# Patient Record
Sex: FEMALE | Race: WHITE | NOT HISPANIC OR LATINO | Employment: FULL TIME | URBAN - METROPOLITAN AREA
[De-identification: names, ages, dates, MRNs, and addresses within clinical notes are randomized per-mention and may not be internally consistent; named-entity substitution may affect disease eponyms.]

---

## 2022-06-15 ENCOUNTER — TRANSCRIBE ORDERS (OUTPATIENT)
Dept: MATERNAL FETAL MEDICINE | Facility: CLINIC | Age: 42
End: 2022-06-15

## 2022-06-15 DIAGNOSIS — O26.90 PREGNANCY RELATED CONDITION, ANTEPARTUM: Primary | ICD-10-CM

## 2022-06-16 ENCOUNTER — PRE VISIT (OUTPATIENT)
Dept: MATERNAL FETAL MEDICINE | Facility: CLINIC | Age: 42
End: 2022-06-16
Payer: COMMERCIAL

## 2022-06-23 ENCOUNTER — HOSPITAL ENCOUNTER (OUTPATIENT)
Dept: ULTRASOUND IMAGING | Facility: CLINIC | Age: 42
Discharge: HOME | End: 2022-06-23
Attending: NURSE PRACTITIONER
Payer: COMMERCIAL

## 2022-06-23 ENCOUNTER — OFFICE VISIT (OUTPATIENT)
Dept: MATERNAL FETAL MEDICINE | Facility: CLINIC | Age: 42
End: 2022-06-23
Attending: NURSE PRACTITIONER
Payer: COMMERCIAL

## 2022-06-23 DIAGNOSIS — O99.210 OBESITY IN PREGNANCY, ANTEPARTUM: ICD-10-CM

## 2022-06-23 DIAGNOSIS — O26.90 PREGNANCY RELATED CONDITION, ANTEPARTUM: ICD-10-CM

## 2022-06-23 DIAGNOSIS — O09.522 MULTIGRAVIDA OF ADVANCED MATERNAL AGE IN SECOND TRIMESTER: Primary | ICD-10-CM

## 2022-06-23 DIAGNOSIS — O09.529 ANTEPARTUM MULTIGRAVIDA OF ADVANCED MATERNAL AGE: Primary | ICD-10-CM

## 2022-06-23 PROCEDURE — 76801 OB US < 14 WKS SINGLE FETUS: CPT

## 2022-06-23 PROCEDURE — 76813 OB US NUCHAL MEAS 1 GEST: CPT | Mod: 26 | Performed by: OBSTETRICS & GYNECOLOGY

## 2022-06-23 PROCEDURE — 96040 HC GENETIC COUNSELING, EACH 30 MINUTES: CPT | Performed by: GENETIC COUNSELOR, MS

## 2022-06-23 NOTE — PROGRESS NOTES
[facility] [place]  Genetic Counseling Consult    Patient: Dayana Whalen YOB: 1980   Date of Service: 22      Dayana Whalen was seen at [facility] for genetic consultation to discuss the options for screening and testing for fetal chromosome abnormalities.  The indication for genetic counseling is advanced maternal age. The patient was accompanied by her partner, Torey to today's visit.        Impression/Plan:   1.  Dayana had an NT ultrasound today. She underwent NIPT earlier in this pregnancy with primary OB which was reportedly normal. A copy of this report was not available at time of our discussion. They are aware diagnostic testing remains available.      2.  Maternal serum AFP (single marker screen) is recommended after 15 weeks to screen for open neural tube defects. A quad screen should not be performed.    3.  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 Dayana is planning to return for level II comprehensive ultrasound.     Pregnancy History:   /Parity:    Age at Delivery: 42 year old  MONALISA: 2022, by Other Basis  Gestational Age: 13w5d    No significant complications or exposures were reported in the current pregnancy.    Medical History:   Dayana has a history of hypothyroidism managed with levothyroxine. Dayana stated that she was found to have a hole in her heart at birth which resolved on it's own. Today we reviewed that there can be structural heart defects such as ASD or VSD that can be isolated and are often multifactorial, resulting from a combination of genetic and nongenetic factors. There can also be heart defects such as PFO or PDA's that are normal holes in heart structure that infants are born with that typically close shortly after birth on it's own. However, in a portion of individuals it may not close properly. We  discussed that prenatal screening by ultrasound or fetal echocardiogram are not necessarily useful for the latter types of heart defects as we would expect this hole to be open in a fetus. They were also encouraged to share this history with their pediatrician.        Family History:   A three-generation pedigree was obtained, and is scanned under the  Media  tab.   The following significant findings were reported by Dayana:    It was reported that Dayana's brother passed away at age 30. He had a history of seizures and a blood clot in his brain. We reviewed that there can be many factors including genetic factors that can increase the risk for a blood clot in the brain. We also reviewed that seizure disorders be isolated or part of a broader genetic syndrome. Dayana is not aware of any underlying genetic condition identified in her brother. They were encouraged to share this family history with their pediatrician.   Dayana's sister was reported to have glioblastoma diagnosed at age 42. Dayana's mother was reported to have a history of uterine cancer diagnosed in her 40-50's. Dayana's maternal grandmother was reported to have lung and liver cancer. These relatives were also noted to have other environmental factors such as smoking, and EtOH use. We discussed how most cancer seen in families occurs sporadically, but about 5-10% may be due to an underlying genetic etiology. The couple was encouraged to share this family history information with their primary care providers to ensure appropriate screening.    Otherwise, the reported family history is negative for multiple miscarriages, stillbirths, birth defects, intellectual disability, known genetic conditions, and consanguinity.       Carrier Screening:   The patient reports that she and the father of the pregnancy have  ancestry:     Cystic fibrosis is an autosomal recessive genetic condition that occurs with increased frequency in  individuals of  ancestry and carrier screening for this condition is available.  In addition,  screening in the [facility] includes cystic fibrosis.      Expanded carrier screening for mutations in a large panel of genes associated with autosomal recessive conditions including cystic fibrosis, spinal muscular atrophy, and others, is now available.      The couple did not elect to pursue the carrier screening options discussed today.        Risk Assessment for Chromosome Conditions:   We explained that the risk for fetal chromosome abnormalities increases with maternal age. We discussed specific features of common chromosome abnormalities, including Down syndrome, trisomy 13, trisomy 18, and sex chromosome trisomies.      - At age 42 at midtrimester, the risk to have a baby with Down syndrome is 1 in 31.     - At age 42 at midtrimester, the risk to have a baby with any chromosome abnormality is 1 in 19.     Testing Options:   We discussed the following options:   Non-invasive Prenatal Testing (NIPT)    Maternal plasma cell-free DNA testing; first trimester ultrasound with nuchal translucency and nasal bone assessment is recommended, when appropriate    Screens for fetal trisomy 21, trisomy 13, trisomy 18, and sex chromosome aneuploidy    Cannot screen for open neural tube defects; maternal serum AFP after 15 weeks is recommended     Chorionic villus sampling (CVS)    Invasive procedure typically performed in the first trimester by which placental villi are obtained for the purpose of chromosome analysis and/or other prenatal genetic analysis    Diagnostic results; >99% sensitivity for fetal chromosome abnormalities    Cannot test for open neural tube defects; maternal serum AFP after 15 weeks is recommended     Genetic Amniocentesis    Invasive procedure typically performed in the second trimester by which amniotic fluid is obtained for the purpose of chromosome analysis and/or other prenatal  genetic analysis    Diagnostic results; >99% sensitivity for fetal chromosome abnormalities    AFAFP measurement tests for open neural tube defects     Comprehensive (Level II) ultrasound: Detailed ultrasound performed between 18-22 weeks gestation to screen for major birth defects and markers for aneuploidy.      We reviewed the benefits and limitations of this testing.  Screening tests provide a risk assessment specific to the pregnancy for certain fetal chromosome abnormalities, but cannot definitively diagnose or exclude a fetal chromosome abnormality.  Follow-up genetic counseling and consideration of diagnostic testing is recommended with any abnormal screening result.     Diagnostic tests carry inherent risks- including risk of miscarriage- that require careful consideration.  These tests can detect fetal chromosome abnormalities with greater than 99% certainty.  Results can be compromised by maternal cell contamination or mosaicism, and are limited by the resolution of cytogenetic G-banding technology.  There is no screening nor diagnostic test that can detect all forms of birth defects or mental disability.     It was a pleasure to be involved with Dayana s care. Face-to-face time of the meeting was 30 minutes.    Idania Manrique MS, [facility]  Licensed Genetic Counselor  [facility]  Maternal Fetal Medicine  Ph: [phone]  [email or web address]

## 2022-06-24 NOTE — PROGRESS NOTES
"Please see \"Imaging\" tab under \"Chart Review\" for details of today's ultrasound.    Caden Bustamante M.D.  Specialist in Maternal-Fetal Medicine     "

## 2022-07-24 ENCOUNTER — HEALTH MAINTENANCE LETTER (OUTPATIENT)
Age: 42
End: 2022-07-24

## 2022-07-28 ENCOUNTER — HOSPITAL ENCOUNTER (OUTPATIENT)
Dept: ULTRASOUND IMAGING | Facility: CLINIC | Age: 42
Discharge: HOME | End: 2022-07-28
Attending: OBSTETRICS & GYNECOLOGY
Payer: COMMERCIAL

## 2022-07-28 ENCOUNTER — OFFICE VISIT (OUTPATIENT)
Dept: MATERNAL FETAL MEDICINE | Facility: CLINIC | Age: 42
End: 2022-07-28
Attending: OBSTETRICS & GYNECOLOGY
Payer: COMMERCIAL

## 2022-07-28 DIAGNOSIS — O99.210 OBESITY IN PREGNANCY, ANTEPARTUM: Primary | ICD-10-CM

## 2022-07-28 DIAGNOSIS — O09.529 ANTEPARTUM MULTIGRAVIDA OF ADVANCED MATERNAL AGE: ICD-10-CM

## 2022-07-28 DIAGNOSIS — Z82.79 FAMILY HISTORY OF CONGENITAL ANOMALIES: ICD-10-CM

## 2022-07-28 DIAGNOSIS — O99.210 OBESITY IN PREGNANCY, ANTEPARTUM: ICD-10-CM

## 2022-07-28 PROCEDURE — 76811 OB US DETAILED SNGL FETUS: CPT | Mod: 26 | Performed by: OBSTETRICS & GYNECOLOGY

## 2022-07-28 PROCEDURE — 76811 OB US DETAILED SNGL FETUS: CPT

## 2022-09-07 ENCOUNTER — HOSPITAL ENCOUNTER (OUTPATIENT)
Dept: CARDIOLOGY | Facility: CLINIC | Age: 42
Discharge: HOME | End: 2022-09-07
Attending: OBSTETRICS & GYNECOLOGY | Admitting: OBSTETRICS & GYNECOLOGY
Payer: COMMERCIAL

## 2022-09-07 ENCOUNTER — OFFICE VISIT (OUTPATIENT)
Dept: CARDIOLOGY | Facility: CLINIC | Age: 42
End: 2022-09-07
Payer: COMMERCIAL

## 2022-09-07 DIAGNOSIS — Z82.79 FAMILY HISTORY OF CONGENITAL ANOMALIES: ICD-10-CM

## 2022-09-07 DIAGNOSIS — O99.210 OBESITY IN PREGNANCY, ANTEPARTUM: ICD-10-CM

## 2022-09-07 DIAGNOSIS — O35.BXX0 FETAL CARDIAC DISEASE AFFECTING PREGNANCY, SINGLE OR UNSPECIFIED FETUS: Primary | ICD-10-CM

## 2022-09-07 PROCEDURE — 93325 DOPPLER ECHO COLOR FLOW MAPG: CPT

## 2022-09-07 PROCEDURE — 99202 OFFICE O/P NEW SF 15 MIN: CPT | Mod: 25 | Performed by: PEDIATRICS

## 2022-09-07 PROCEDURE — 76825 ECHO EXAM OF FETAL HEART: CPT | Mod: 26 | Performed by: PEDIATRICS

## 2022-09-07 PROCEDURE — 76827 ECHO EXAM OF FETAL HEART: CPT | Mod: 26 | Performed by: PEDIATRICS

## 2022-09-07 PROCEDURE — 93325 DOPPLER ECHO COLOR FLOW MAPG: CPT | Mod: 26 | Performed by: PEDIATRICS

## 2022-09-07 NOTE — PROGRESS NOTES
"[facility]etal Consult Note    Patient:  Dayana Whalen MRN:  [id number]   YOB: 1980 Age:  41 year old   Date of Visit:  2022 PCP:  Angelique Alvarez APRN CNM     Dear Doctor,     I had the pleasure of seeing Dayana Whalen at the [facility] on 2022 in fetal cardiology consultation for fetal echocardiogram results. She presented today accompanied by her . As you know, she is a 41 year old  at 24w2d who presented for fetal echocardiogram today because of maternal history of \"hole in her heart\" and incomplete cardiac exam.    I performed and interpreted the fetal echocardiogram today, which demonstrated likely normal fetal echocardiogram. Normal fetal intracardiac connections. Normal right and left ventricular size and function. The aortic isthmus was not well visualized; no indirect evidence of coarcation of the aorta. No hydrops.    I reviewed the echo findings today with Dayana Whalen and her . Although this was a technically difficult study, the patient is aware that no obvious cardiac abnormalities were identified. She is aware of the general limitations of fetal echocardiography. No additional fetal echocardiograms are recommended. No  cardiac follow-up is required.     Thank you for allowing me to participate in Dayana's care. Please do not hesitate to contact me with questions or concerns.    This visit was separate from the performance and interpretation of the ultrasound. The majority of the time (>50%) was spent in counseling and coordination of care. I spent approximately 20 minutes in face-to-face time reviewing the above considerations.    Tonya Sanabria M.D.  Pediatric Cardiology  [address], 5th floor, [place] [address]  Phone [phone]  Fax [phone]    "

## 2022-09-22 ENCOUNTER — TRANSFERRED RECORDS (OUTPATIENT)
Dept: HEALTH INFORMATION MANAGEMENT | Facility: CLINIC | Age: 42
End: 2022-09-22

## 2022-10-03 ENCOUNTER — HEALTH MAINTENANCE LETTER (OUTPATIENT)
Age: 42
End: 2022-10-03

## 2022-10-06 ENCOUNTER — HOSPITAL ENCOUNTER (OUTPATIENT)
Dept: ULTRASOUND IMAGING | Facility: CLINIC | Age: 42
Discharge: HOME | End: 2022-10-06
Attending: OBSTETRICS & GYNECOLOGY
Payer: COMMERCIAL

## 2022-10-06 ENCOUNTER — OFFICE VISIT (OUTPATIENT)
Dept: MATERNAL FETAL MEDICINE | Facility: CLINIC | Age: 42
End: 2022-10-06
Attending: OBSTETRICS & GYNECOLOGY
Payer: COMMERCIAL

## 2022-10-06 DIAGNOSIS — O99.210 OBESITY IN PREGNANCY, ANTEPARTUM: ICD-10-CM

## 2022-10-06 DIAGNOSIS — O09.513 AMA (ADVANCED MATERNAL AGE) PRIMIGRAVIDA 35+, THIRD TRIMESTER: Primary | ICD-10-CM

## 2022-10-06 DIAGNOSIS — Z82.79 FAMILY HISTORY OF CONGENITAL ANOMALIES: ICD-10-CM

## 2022-10-06 PROCEDURE — 76816 OB US FOLLOW-UP PER FETUS: CPT

## 2022-10-06 PROCEDURE — 76816 OB US FOLLOW-UP PER FETUS: CPT | Mod: 26 | Performed by: OBSTETRICS & GYNECOLOGY

## 2022-11-23 ENCOUNTER — MEDICAL CORRESPONDENCE (OUTPATIENT)
Dept: HEALTH INFORMATION MANAGEMENT | Facility: CLINIC | Age: 42
End: 2022-11-23

## 2022-11-25 ENCOUNTER — TRANSCRIBE ORDERS (OUTPATIENT)
Dept: MATERNAL FETAL MEDICINE | Facility: CLINIC | Age: 42
End: 2022-11-25

## 2022-11-25 DIAGNOSIS — O26.90 PREGNANCY RELATED CONDITION, ANTEPARTUM: Primary | ICD-10-CM

## 2022-12-01 ENCOUNTER — OFFICE VISIT (OUTPATIENT)
Dept: MATERNAL FETAL MEDICINE | Facility: CLINIC | Age: 42
End: 2022-12-01
Attending: NURSE PRACTITIONER
Payer: COMMERCIAL

## 2022-12-01 ENCOUNTER — HOSPITAL ENCOUNTER (OUTPATIENT)
Dept: ULTRASOUND IMAGING | Facility: CLINIC | Age: 42
Discharge: HOME | End: 2022-12-01
Attending: NURSE PRACTITIONER
Payer: COMMERCIAL

## 2022-12-01 DIAGNOSIS — O09.523 AMA (ADVANCED MATERNAL AGE) MULTIGRAVIDA 35+, THIRD TRIMESTER: Primary | ICD-10-CM

## 2022-12-01 DIAGNOSIS — O26.90 PREGNANCY RELATED CONDITION, ANTEPARTUM: ICD-10-CM

## 2022-12-01 PROCEDURE — 76816 OB US FOLLOW-UP PER FETUS: CPT

## 2022-12-01 PROCEDURE — 76819 FETAL BIOPHYS PROFIL W/O NST: CPT | Mod: 26 | Performed by: OBSTETRICS & GYNECOLOGY

## 2022-12-01 PROCEDURE — 76816 OB US FOLLOW-UP PER FETUS: CPT | Mod: 26 | Performed by: OBSTETRICS & GYNECOLOGY

## 2022-12-01 NOTE — PROGRESS NOTES
Please see full imaging report from ViewPoint program under imaging tab.    Luzmaria Hope MD  Maternal Fetal Medicine

## 2023-01-07 ENCOUNTER — HOSPITAL ENCOUNTER (INPATIENT)
Facility: CLINIC | Age: 43
LOS: 3 days | Discharge: HOME | End: 2023-01-10
Attending: OBSTETRICS & GYNECOLOGY | Admitting: OBSTETRICS & GYNECOLOGY
Payer: COMMERCIAL

## 2023-01-07 ENCOUNTER — TRANSFERRED RECORDS (OUTPATIENT)
Dept: HEALTH INFORMATION MANAGEMENT | Facility: CLINIC | Age: 43
End: 2023-01-07

## 2023-01-07 DIAGNOSIS — D62 ABLA (ACUTE BLOOD LOSS ANEMIA): Primary | ICD-10-CM

## 2023-01-07 DIAGNOSIS — Z98.891 S/P CESAREAN SECTION: ICD-10-CM

## 2023-01-07 PROBLEM — L90.0 LICHEN SCLEROSUS: Status: ACTIVE | Noted: 2020-12-16

## 2023-01-07 PROBLEM — Z34.90 ENCOUNTER FOR INDUCTION OF LABOR: Status: ACTIVE | Noted: 2023-01-07

## 2023-01-07 PROBLEM — R73.03 PREDIABETES: Status: ACTIVE | Noted: 2020-12-16

## 2023-01-07 PROBLEM — O09.93 HIGH-RISK PREGNANCY SUPERVISION, THIRD TRIMESTER: Status: ACTIVE | Noted: 2022-05-02

## 2023-01-07 PROBLEM — N94.3 PMS (PREMENSTRUAL SYNDROME): Status: ACTIVE | Noted: 2020-12-16

## 2023-01-07 PROBLEM — O24.410 DIET CONTROLLED GESTATIONAL DIABETES MELLITUS (GDM) IN THIRD TRIMESTER: Status: ACTIVE | Noted: 2023-01-07

## 2023-01-07 PROBLEM — O09.519 SUPERVISION OF PRIMIGRAVIDA OF ADVANCED MATERNAL AGE, ANTEPARTUM: Status: ACTIVE | Noted: 2022-05-02

## 2023-01-07 PROBLEM — E66.01 MORBID OBESITY (H): Status: ACTIVE | Noted: 2023-01-07

## 2023-01-07 LAB
ABO/RH(D): NORMAL
ANTIBODY SCREEN: NEGATIVE
ERYTHROCYTE [DISTWIDTH] IN BLOOD BY AUTOMATED COUNT: 14.6 % (ref 10–15)
GLUCOSE BLDC GLUCOMTR-MCNC: 78 MG/DL (ref 70–99)
GLUCOSE BLDC GLUCOMTR-MCNC: 97 MG/DL (ref 70–99)
HCT VFR BLD AUTO: 34.2 % (ref 35–47)
HGB BLD-MCNC: 11.2 G/DL (ref 11.7–15.7)
MCH RBC QN AUTO: 28.1 PG (ref 26.5–33)
MCHC RBC AUTO-ENTMCNC: 32.7 G/DL (ref 31.5–36.5)
MCV RBC AUTO: 86 FL (ref 78–100)
PLATELET # BLD AUTO: 247 10E3/UL (ref 150–450)
RBC # BLD AUTO: 3.98 10E6/UL (ref 3.8–5.2)
SARS-COV-2 RNA RESP QL NAA+PROBE: NEGATIVE
SPECIMEN EXPIRATION DATE: NORMAL
WBC # BLD AUTO: 11.2 10E3/UL (ref 4–11)

## 2023-01-07 PROCEDURE — 120N000002 HC R&B MED SURG/OB UMMC

## 2023-01-07 PROCEDURE — 36415 COLL VENOUS BLD VENIPUNCTURE: CPT | Performed by: STUDENT IN AN ORGANIZED HEALTH CARE EDUCATION/TRAINING PROGRAM

## 2023-01-07 PROCEDURE — 250N000011 HC RX IP 250 OP 636: Performed by: STUDENT IN AN ORGANIZED HEALTH CARE EDUCATION/TRAINING PROGRAM

## 2023-01-07 PROCEDURE — 250N000009 HC RX 250: Performed by: STUDENT IN AN ORGANIZED HEALTH CARE EDUCATION/TRAINING PROGRAM

## 2023-01-07 PROCEDURE — 86780 TREPONEMA PALLIDUM: CPT | Performed by: STUDENT IN AN ORGANIZED HEALTH CARE EDUCATION/TRAINING PROGRAM

## 2023-01-07 PROCEDURE — 86901 BLOOD TYPING SEROLOGIC RH(D): CPT | Performed by: STUDENT IN AN ORGANIZED HEALTH CARE EDUCATION/TRAINING PROGRAM

## 2023-01-07 PROCEDURE — 85027 COMPLETE CBC AUTOMATED: CPT | Performed by: STUDENT IN AN ORGANIZED HEALTH CARE EDUCATION/TRAINING PROGRAM

## 2023-01-07 PROCEDURE — U0005 INFEC AGEN DETEC AMPLI PROBE: HCPCS | Performed by: STUDENT IN AN ORGANIZED HEALTH CARE EDUCATION/TRAINING PROGRAM

## 2023-01-07 RX ORDER — PROCHLORPERAZINE MALEATE 10 MG
10 TABLET ORAL EVERY 6 HOURS PRN
Status: DISCONTINUED | OUTPATIENT
Start: 2023-01-07 | End: 2023-01-09 | Stop reason: HOSPADM

## 2023-01-07 RX ORDER — NALOXONE HYDROCHLORIDE 0.4 MG/ML
0.4 INJECTION, SOLUTION INTRAMUSCULAR; INTRAVENOUS; SUBCUTANEOUS
Status: DISCONTINUED | OUTPATIENT
Start: 2023-01-07 | End: 2023-01-09 | Stop reason: HOSPADM

## 2023-01-07 RX ORDER — LEVOTHYROXINE SODIUM 125 UG/1
112 TABLET ORAL DAILY
COMMUNITY

## 2023-01-07 RX ORDER — NICOTINE POLACRILEX 4 MG
15-30 LOZENGE BUCCAL
Status: DISCONTINUED | OUTPATIENT
Start: 2023-01-07 | End: 2023-01-09

## 2023-01-07 RX ORDER — PROCHLORPERAZINE 25 MG
25 SUPPOSITORY, RECTAL RECTAL EVERY 12 HOURS PRN
Status: DISCONTINUED | OUTPATIENT
Start: 2023-01-07 | End: 2023-01-09 | Stop reason: HOSPADM

## 2023-01-07 RX ORDER — DEXTROSE MONOHYDRATE 25 G/50ML
25-50 INJECTION, SOLUTION INTRAVENOUS
Status: DISCONTINUED | OUTPATIENT
Start: 2023-01-07 | End: 2023-01-09

## 2023-01-07 RX ORDER — OXYTOCIN/0.9 % SODIUM CHLORIDE 30/500 ML
340 PLASTIC BAG, INJECTION (ML) INTRAVENOUS CONTINUOUS PRN
Status: DISCONTINUED | OUTPATIENT
Start: 2023-01-07 | End: 2023-01-09 | Stop reason: HOSPADM

## 2023-01-07 RX ORDER — NALOXONE HYDROCHLORIDE 0.4 MG/ML
0.2 INJECTION, SOLUTION INTRAMUSCULAR; INTRAVENOUS; SUBCUTANEOUS
Status: DISCONTINUED | OUTPATIENT
Start: 2023-01-07 | End: 2023-01-09 | Stop reason: HOSPADM

## 2023-01-07 RX ORDER — IBUPROFEN 800 MG/1
800 TABLET, FILM COATED ORAL
Status: DISCONTINUED | OUTPATIENT
Start: 2023-01-07 | End: 2023-01-09

## 2023-01-07 RX ORDER — OXYTOCIN/0.9 % SODIUM CHLORIDE 30/500 ML
100-340 PLASTIC BAG, INJECTION (ML) INTRAVENOUS CONTINUOUS PRN
Status: DISCONTINUED | OUTPATIENT
Start: 2023-01-07 | End: 2023-01-09

## 2023-01-07 RX ORDER — PENICILLIN G 3000000 [IU]/50ML
3 INJECTION, SOLUTION INTRAVENOUS EVERY 4 HOURS
Status: DISCONTINUED | OUTPATIENT
Start: 2023-01-07 | End: 2023-01-09 | Stop reason: HOSPADM

## 2023-01-07 RX ORDER — ONDANSETRON 2 MG/ML
4 INJECTION INTRAMUSCULAR; INTRAVENOUS EVERY 6 HOURS PRN
Status: DISCONTINUED | OUTPATIENT
Start: 2023-01-07 | End: 2023-01-08

## 2023-01-07 RX ORDER — SODIUM CHLORIDE, SODIUM LACTATE, POTASSIUM CHLORIDE, CALCIUM CHLORIDE 600; 310; 30; 20 MG/100ML; MG/100ML; MG/100ML; MG/100ML
INJECTION, SOLUTION INTRAVENOUS CONTINUOUS
Status: DISCONTINUED | OUTPATIENT
Start: 2023-01-07 | End: 2023-01-09 | Stop reason: HOSPADM

## 2023-01-07 RX ORDER — KETOROLAC TROMETHAMINE 30 MG/ML
30 INJECTION, SOLUTION INTRAMUSCULAR; INTRAVENOUS
Status: DISCONTINUED | OUTPATIENT
Start: 2023-01-07 | End: 2023-01-09

## 2023-01-07 RX ORDER — SODIUM CHLORIDE, SODIUM LACTATE, POTASSIUM CHLORIDE, CALCIUM CHLORIDE 600; 310; 30; 20 MG/100ML; MG/100ML; MG/100ML; MG/100ML
INJECTION, SOLUTION INTRAVENOUS
Status: DISCONTINUED
Start: 2023-01-07 | End: 2023-01-08 | Stop reason: HOSPADM

## 2023-01-07 RX ORDER — FENTANYL CITRATE 50 UG/ML
100 INJECTION, SOLUTION INTRAMUSCULAR; INTRAVENOUS
Status: DISCONTINUED | OUTPATIENT
Start: 2023-01-07 | End: 2023-01-09 | Stop reason: HOSPADM

## 2023-01-07 RX ORDER — OXYTOCIN 10 [USP'U]/ML
10 INJECTION, SOLUTION INTRAMUSCULAR; INTRAVENOUS
Status: DISCONTINUED | OUTPATIENT
Start: 2023-01-07 | End: 2023-01-09

## 2023-01-07 RX ORDER — ONDANSETRON 4 MG/1
4 TABLET, ORALLY DISINTEGRATING ORAL EVERY 6 HOURS PRN
Status: DISCONTINUED | OUTPATIENT
Start: 2023-01-07 | End: 2023-01-08

## 2023-01-07 RX ORDER — LIDOCAINE 40 MG/G
CREAM TOPICAL
Status: DISCONTINUED | OUTPATIENT
Start: 2023-01-07 | End: 2023-01-09 | Stop reason: HOSPADM

## 2023-01-07 RX ORDER — SODIUM CHLORIDE, SODIUM LACTATE, POTASSIUM CHLORIDE, CALCIUM CHLORIDE 600; 310; 30; 20 MG/100ML; MG/100ML; MG/100ML; MG/100ML
INJECTION, SOLUTION INTRAVENOUS CONTINUOUS PRN
Status: DISCONTINUED | OUTPATIENT
Start: 2023-01-07 | End: 2023-01-09 | Stop reason: HOSPADM

## 2023-01-07 RX ORDER — METHYLERGONOVINE MALEATE 0.2 MG/ML
200 INJECTION INTRAVENOUS
Status: DISCONTINUED | OUTPATIENT
Start: 2023-01-07 | End: 2023-01-09 | Stop reason: HOSPADM

## 2023-01-07 RX ORDER — MISOPROSTOL 200 UG/1
800 TABLET ORAL
Status: DISCONTINUED | OUTPATIENT
Start: 2023-01-07 | End: 2023-01-09 | Stop reason: HOSPADM

## 2023-01-07 RX ORDER — CITRIC ACID/SODIUM CITRATE 334-500MG
30 SOLUTION, ORAL ORAL
Status: DISCONTINUED | OUTPATIENT
Start: 2023-01-07 | End: 2023-01-09 | Stop reason: HOSPADM

## 2023-01-07 RX ORDER — CITRIC ACID/SODIUM CITRATE 334-500MG
30 SOLUTION, ORAL ORAL ONCE
Status: COMPLETED | OUTPATIENT
Start: 2023-01-07 | End: 2023-01-08

## 2023-01-07 RX ORDER — OXYTOCIN/0.9 % SODIUM CHLORIDE 30/500 ML
1-30 PLASTIC BAG, INJECTION (ML) INTRAVENOUS CONTINUOUS
Status: DISCONTINUED | OUTPATIENT
Start: 2023-01-07 | End: 2023-01-09 | Stop reason: HOSPADM

## 2023-01-07 RX ORDER — METOCLOPRAMIDE HYDROCHLORIDE 5 MG/ML
10 INJECTION INTRAMUSCULAR; INTRAVENOUS EVERY 6 HOURS PRN
Status: DISCONTINUED | OUTPATIENT
Start: 2023-01-07 | End: 2023-01-09 | Stop reason: HOSPADM

## 2023-01-07 RX ORDER — PENICILLIN G POTASSIUM 5000000 [IU]/1
5 INJECTION, POWDER, FOR SOLUTION INTRAMUSCULAR; INTRAVENOUS ONCE
Status: COMPLETED | OUTPATIENT
Start: 2023-01-07 | End: 2023-01-07

## 2023-01-07 RX ORDER — CARBOPROST TROMETHAMINE 250 UG/ML
250 INJECTION, SOLUTION INTRAMUSCULAR
Status: DISCONTINUED | OUTPATIENT
Start: 2023-01-07 | End: 2023-01-09 | Stop reason: HOSPADM

## 2023-01-07 RX ORDER — METOCLOPRAMIDE 10 MG/1
10 TABLET ORAL EVERY 6 HOURS PRN
Status: DISCONTINUED | OUTPATIENT
Start: 2023-01-07 | End: 2023-01-09 | Stop reason: HOSPADM

## 2023-01-07 RX ORDER — MISOPROSTOL 200 UG/1
400 TABLET ORAL
Status: DISCONTINUED | OUTPATIENT
Start: 2023-01-07 | End: 2023-01-09 | Stop reason: HOSPADM

## 2023-01-07 RX ORDER — TRANEXAMIC ACID 10 MG/ML
1 INJECTION, SOLUTION INTRAVENOUS EVERY 30 MIN PRN
Status: DISCONTINUED | OUTPATIENT
Start: 2023-01-07 | End: 2023-01-09 | Stop reason: HOSPADM

## 2023-01-07 RX ORDER — OXYTOCIN 10 [USP'U]/ML
10 INJECTION, SOLUTION INTRAMUSCULAR; INTRAVENOUS
Status: DISCONTINUED | OUTPATIENT
Start: 2023-01-07 | End: 2023-01-09 | Stop reason: HOSPADM

## 2023-01-07 RX ORDER — ACETAMINOPHEN 325 MG/1
650 TABLET ORAL EVERY 4 HOURS PRN
Status: DISCONTINUED | OUTPATIENT
Start: 2023-01-07 | End: 2023-01-09 | Stop reason: HOSPADM

## 2023-01-07 RX ADMIN — PENICILLIN G POTASSIUM 5 MILLION UNITS: 5000000 POWDER, FOR SOLUTION INTRAMUSCULAR; INTRAPLEURAL; INTRATHECAL; INTRAVENOUS at 17:23

## 2023-01-07 RX ADMIN — PENICILLIN G 3 MILLION UNITS: 3000000 INJECTION, SOLUTION INTRAVENOUS at 22:25

## 2023-01-07 RX ADMIN — Medication 2 MILLI-UNITS/MIN: at 17:30

## 2023-01-07 ASSESSMENT — ACTIVITIES OF DAILY LIVING (ADL)
ADLS_ACUITY_SCORE: 20

## 2023-01-07 NOTE — H&P
History and Physical     Dayana Whalen MRN# [id number]   YOB: 1980 Age: 42 year old      Date of Admission: 2023    Primary care provider: Angelique Alvarez      Assessment and Plan:       42 year old  at 41w5d by 13w3d US, here as MEETA from [place] for IOL in setting of GDMA1, BMI 51, post-dates. Pregnancy is notable for AMA status.    Induction of Labor   - Indication: GDMA1. Favorable SVE. To start Pitocin.   - Pain: per patient request.     Routine Prenatal Care   - Rh positive, Rubella immune, GCT failed, GBS positive - will need PCN  - Other prenatal labs wnl  - Imaging: Growth US (22): EFW 63%, AC 92%.     GDMA1   SD precautions discussed.   - mSSI, AC/HS BG in latent labor > Q2h BG in active labor, possibly insulin gtt prn      FWB:   Cat I tracing, reactive; cephalic by BSUS; EFW 7lb  - Continuous Fetal Monitoring  - Intrauterine resuscitative measures prn    Patient discussed with Dr. Pfeiffer.     Zahida Cody MD   OB/GYN PGY-3  23 1600    Appreciate Dr. Cody's note above, patient also seen and examined by me. I agree with the note above.   Lois Pfeiffer MD            HPI:     Dayana Whalen is a 42 year old  at 41w5d by 13w US presenting as MEETA from [place] for IOL d/t GDMA1, AMA, post-dates, BMI 50.    Feeling no contractions. Denies vaginal bleeding, leaking fluids, changes to fetal movement. All other ROS negative.     OB History:    OB History    Para Term  AB Living   1 0 0 0 0 0   SAB IAB Ectopic Multiple Live Births   0 0 0 0 0      # Outcome Date GA Lbr Teto/2nd Weight Sex Delivery Anes PTL Lv   1 Current                 Prenatal Lab Results:  Lab Results   Component Value Date    HGB 11.2 (L) 2023       GBS Status:   No results found for: GBS             Past Medical History:   No past medical history on file.          Past Surgical History:     Past Surgical History:   Procedure Laterality Date     AS REMOVAL OF TONSILS,12+  "Y/O  1998     CHOLECYSTECTOMY  2007             Social History:     Social History     Tobacco Use     Smoking status: Not on file     Smokeless tobacco: Not on file   Substance Use Topics     Alcohol use: Not on file             Family History:   No family history on file.          Immunizations:     Immunization History   Administered Date(s) Administered     COVID-19 Vaccine ([facility]) 04/10/2021     COVID-19 Vaccine 12+ (Pfizer 2022) 04/10/2022            Allergies:     Allergies   Allergen Reactions     Sulfa Drugs Hives             Medications:     Medications Prior to Admission   Medication Sig Dispense Refill Last Dose     levothyroxine (SYNTHROID/LEVOTHROID) 125 MCG tablet Take 125 mcg by mouth daily   1/7/2023             Review of Systems & Physical Exam:     The Review of Systems is negative other than noted in the HPI      /58 (BP Location: Left arm, Patient Position: Sitting, Cuff Size: Adult Large)   Temp 98.3  F (36.8  C) (Oral)   Ht 1.6 m (5' 3\")   Wt 129.3 kg (285 lb)   LMP 03/17/2022   BMI 50.49 kg/m    Gen: Well appearing   CV: RRR, nl S1/S2, no murmurs/clicks/gallops  Lungs: CTAB, non-labored breathing  Abd: Gravid, non-tender, non-distended  Ext: Trace peripheral extremity edema    Cervix: 4/50/-2 per Dr. Pfeiffer's check   Membranes: Intact   Presentation: Ceph by BSUS.  Estimated Fetal Weight: 7lb    FHT:  Monitoring External  FHT: Baseline 145 bpm; mod variability; pos accels present; no decelerations  TOCO 1-2 contractions in 10 minutes         Data:     Recent Results (from the past 24 hour(s))   Asymptomatic COVID-19 Virus (Coronavirus) by PCR Nose    Collection Time: 01/07/23  4:41 PM    Specimen: Nose; Swab   Result Value Ref Range    SARS CoV2 PCR Negative Negative   CBC with platelets    Collection Time: 01/07/23  4:43 PM   Result Value Ref Range    WBC Count 11.2 (H) 4.0 - 11.0 10e3/uL    RBC Count 3.98 3.80 - 5.20 10e6/uL    Hemoglobin 11.2 (L) 11.7 - 15.7 g/dL    Hematocrit " 34.2 (L) 35.0 - 47.0 %    MCV 86 78 - 100 fL    MCH 28.1 26.5 - 33.0 pg    MCHC 32.7 31.5 - 36.5 g/dL    RDW 14.6 10.0 - 15.0 %    Platelet Count 247 150 - 450 10e3/uL   Glucose by meter    Collection Time: 01/07/23  4:52 PM   Result Value Ref Range    GLUCOSE BY METER POCT 78 70 - 99 mg/dL            Zahida Cody MD  OB/GYN PGY-3  01/07/23 4:26 PM

## 2023-01-08 ENCOUNTER — ANESTHESIA (OUTPATIENT)
Dept: OBGYN | Facility: CLINIC | Age: 43
End: 2023-01-08
Payer: COMMERCIAL

## 2023-01-08 ENCOUNTER — ANESTHESIA EVENT (OUTPATIENT)
Dept: OBGYN | Facility: CLINIC | Age: 43
End: 2023-01-08
Payer: COMMERCIAL

## 2023-01-08 LAB
GLUCOSE BLDC GLUCOMTR-MCNC: 104 MG/DL (ref 70–99)
GLUCOSE BLDC GLUCOMTR-MCNC: 115 MG/DL (ref 70–99)
GLUCOSE BLDC GLUCOMTR-MCNC: 80 MG/DL (ref 70–99)
GLUCOSE BLDC GLUCOMTR-MCNC: 86 MG/DL (ref 70–99)
GLUCOSE BLDC GLUCOMTR-MCNC: 92 MG/DL (ref 70–99)
GLUCOSE BLDC GLUCOMTR-MCNC: 93 MG/DL (ref 70–99)
GLUCOSE BLDC GLUCOMTR-MCNC: 98 MG/DL (ref 70–99)
GLUCOSE BLDC GLUCOMTR-MCNC: 99 MG/DL (ref 70–99)
HOLD SPECIMEN: NORMAL
HOLD SPECIMEN: NORMAL
T PALLIDUM AB SER QL: NONREACTIVE

## 2023-01-08 PROCEDURE — 59514 CESAREAN DELIVERY ONLY: CPT | Mod: GC | Performed by: OBSTETRICS & GYNECOLOGY

## 2023-01-08 PROCEDURE — 360N000076 HC SURGERY LEVEL 3, PER MIN: Performed by: OBSTETRICS & GYNECOLOGY

## 2023-01-08 PROCEDURE — 258N000003 HC RX IP 258 OP 636: Performed by: STUDENT IN AN ORGANIZED HEALTH CARE EDUCATION/TRAINING PROGRAM

## 2023-01-08 PROCEDURE — 120N000002 HC R&B MED SURG/OB UMMC

## 2023-01-08 PROCEDURE — 250N000011 HC RX IP 250 OP 636: Performed by: ANESTHESIOLOGY

## 2023-01-08 PROCEDURE — C9290 INJ, BUPIVACAINE LIPOSOME: HCPCS | Performed by: ANESTHESIOLOGY

## 2023-01-08 PROCEDURE — 250N000011 HC RX IP 250 OP 636: Performed by: STUDENT IN AN ORGANIZED HEALTH CARE EDUCATION/TRAINING PROGRAM

## 2023-01-08 PROCEDURE — 250N000009 HC RX 250: Performed by: STUDENT IN AN ORGANIZED HEALTH CARE EDUCATION/TRAINING PROGRAM

## 2023-01-08 PROCEDURE — 10907ZC DRAINAGE OF AMNIOTIC FLUID, THERAPEUTIC FROM PRODUCTS OF CONCEPTION, VIA NATURAL OR ARTIFICIAL OPENING: ICD-10-PCS | Performed by: OBSTETRICS & GYNECOLOGY

## 2023-01-08 PROCEDURE — 370N000017 HC ANESTHESIA TECHNICAL FEE, PER MIN: Performed by: OBSTETRICS & GYNECOLOGY

## 2023-01-08 PROCEDURE — 272N000001 HC OR GENERAL SUPPLY STERILE: Performed by: OBSTETRICS & GYNECOLOGY

## 2023-01-08 PROCEDURE — 250N000013 HC RX MED GY IP 250 OP 250 PS 637: Performed by: STUDENT IN AN ORGANIZED HEALTH CARE EDUCATION/TRAINING PROGRAM

## 2023-01-08 PROCEDURE — 999N000141 HC STATISTIC PRE-PROCEDURE NURSING ASSESSMENT: Performed by: OBSTETRICS & GYNECOLOGY

## 2023-01-08 PROCEDURE — 258N000003 HC RX IP 258 OP 636

## 2023-01-08 PROCEDURE — 271N000001 HC OR GENERAL SUPPLY NON-STERILE: Performed by: OBSTETRICS & GYNECOLOGY

## 2023-01-08 RX ORDER — BUPIVACAINE HYDROCHLORIDE 2.5 MG/ML
INJECTION, SOLUTION EPIDURAL; INFILTRATION; INTRACAUDAL
Status: DISCONTINUED | OUTPATIENT
Start: 2023-01-08 | End: 2023-01-08

## 2023-01-08 RX ORDER — ONDANSETRON 4 MG/1
4 TABLET, ORALLY DISINTEGRATING ORAL EVERY 6 HOURS PRN
Status: DISCONTINUED | OUTPATIENT
Start: 2023-01-08 | End: 2023-01-09 | Stop reason: HOSPADM

## 2023-01-08 RX ORDER — METHYLERGONOVINE MALEATE 0.2 MG/ML
INJECTION INTRAVENOUS PRN
Status: DISCONTINUED | OUTPATIENT
Start: 2023-01-08 | End: 2023-01-08

## 2023-01-08 RX ORDER — ONDANSETRON 2 MG/ML
INJECTION INTRAMUSCULAR; INTRAVENOUS PRN
Status: DISCONTINUED | OUTPATIENT
Start: 2023-01-08 | End: 2023-01-08

## 2023-01-08 RX ORDER — CEFAZOLIN SODIUM 1 G/3ML
INJECTION, POWDER, FOR SOLUTION INTRAMUSCULAR; INTRAVENOUS PRN
Status: DISCONTINUED | OUTPATIENT
Start: 2023-01-08 | End: 2023-01-08

## 2023-01-08 RX ORDER — AZITHROMYCIN 500 MG/5ML
INJECTION, POWDER, LYOPHILIZED, FOR SOLUTION INTRAVENOUS
Status: DISCONTINUED
Start: 2023-01-08 | End: 2023-01-08 | Stop reason: HOSPADM

## 2023-01-08 RX ORDER — AZITHROMYCIN 500 MG/5ML
500 INJECTION, POWDER, LYOPHILIZED, FOR SOLUTION INTRAVENOUS
Status: CANCELLED | OUTPATIENT
Start: 2023-01-08

## 2023-01-08 RX ORDER — SODIUM CHLORIDE 9 MG/ML
INJECTION, SOLUTION INTRAVENOUS CONTINUOUS
Status: DISCONTINUED | OUTPATIENT
Start: 2023-01-08 | End: 2023-01-09 | Stop reason: HOSPADM

## 2023-01-08 RX ORDER — MISOPROSTOL 200 UG/1
800 TABLET ORAL
Status: CANCELLED | OUTPATIENT
Start: 2023-01-08

## 2023-01-08 RX ORDER — SODIUM CHLORIDE, SODIUM LACTATE, POTASSIUM CHLORIDE, CALCIUM CHLORIDE 600; 310; 30; 20 MG/100ML; MG/100ML; MG/100ML; MG/100ML
INJECTION, SOLUTION INTRAVENOUS CONTINUOUS PRN
Status: DISCONTINUED | OUTPATIENT
Start: 2023-01-08 | End: 2023-01-08

## 2023-01-08 RX ORDER — CARBOPROST TROMETHAMINE 250 UG/ML
250 INJECTION, SOLUTION INTRAMUSCULAR
Status: CANCELLED | OUTPATIENT
Start: 2023-01-08

## 2023-01-08 RX ORDER — OXYTOCIN/0.9 % SODIUM CHLORIDE 30/500 ML
PLASTIC BAG, INJECTION (ML) INTRAVENOUS CONTINUOUS PRN
Status: DISCONTINUED | OUTPATIENT
Start: 2023-01-08 | End: 2023-01-08

## 2023-01-08 RX ORDER — SODIUM CHLORIDE, SODIUM LACTATE, POTASSIUM CHLORIDE, CALCIUM CHLORIDE 600; 310; 30; 20 MG/100ML; MG/100ML; MG/100ML; MG/100ML
INJECTION, SOLUTION INTRAVENOUS CONTINUOUS
Status: CANCELLED | OUTPATIENT
Start: 2023-01-08

## 2023-01-08 RX ORDER — MORPHINE SULFATE 1 MG/ML
150 INJECTION, SOLUTION EPIDURAL; INTRATHECAL; INTRAVENOUS ONCE
Status: DISCONTINUED | OUTPATIENT
Start: 2023-01-08 | End: 2023-01-09 | Stop reason: HOSPADM

## 2023-01-08 RX ORDER — CEFAZOLIN SODIUM/WATER 3 G/30 ML
3 SYRINGE (ML) INTRAVENOUS
Status: CANCELLED | OUTPATIENT
Start: 2023-01-08

## 2023-01-08 RX ORDER — MISOPROSTOL 200 UG/1
400 TABLET ORAL
Status: CANCELLED | OUTPATIENT
Start: 2023-01-08

## 2023-01-08 RX ORDER — CITRIC ACID/SODIUM CITRATE 334-500MG
30 SOLUTION, ORAL ORAL
Status: CANCELLED | OUTPATIENT
Start: 2023-01-08

## 2023-01-08 RX ORDER — METHYLERGONOVINE MALEATE 0.2 MG/ML
200 INJECTION INTRAVENOUS
Status: CANCELLED | OUTPATIENT
Start: 2023-01-08

## 2023-01-08 RX ORDER — FENTANYL CITRATE 50 UG/ML
10 INJECTION, SOLUTION INTRAMUSCULAR; INTRAVENOUS ONCE
Status: DISCONTINUED | OUTPATIENT
Start: 2023-01-08 | End: 2023-01-09 | Stop reason: HOSPADM

## 2023-01-08 RX ORDER — TRANEXAMIC ACID 10 MG/ML
1 INJECTION, SOLUTION INTRAVENOUS EVERY 30 MIN PRN
Status: CANCELLED | OUTPATIENT
Start: 2023-01-08

## 2023-01-08 RX ORDER — CEFAZOLIN SODIUM/WATER 3 G/30 ML
3 SYRINGE (ML) INTRAVENOUS SEE ADMIN INSTRUCTIONS
Status: CANCELLED | OUTPATIENT
Start: 2023-01-08

## 2023-01-08 RX ORDER — LIDOCAINE 40 MG/G
CREAM TOPICAL
Status: CANCELLED | OUTPATIENT
Start: 2023-01-08

## 2023-01-08 RX ORDER — FENTANYL CITRATE-0.9 % NACL/PF 10 MCG/ML
100 PLASTIC BAG, INJECTION (ML) INTRAVENOUS EVERY 5 MIN PRN
Status: DISCONTINUED | OUTPATIENT
Start: 2023-01-08 | End: 2023-01-09 | Stop reason: HOSPADM

## 2023-01-08 RX ORDER — SODIUM CHLORIDE, SODIUM LACTATE, POTASSIUM CHLORIDE, CALCIUM CHLORIDE 600; 310; 30; 20 MG/100ML; MG/100ML; MG/100ML; MG/100ML
INJECTION, SOLUTION INTRAVENOUS
Status: COMPLETED
Start: 2023-01-08 | End: 2023-01-08

## 2023-01-08 RX ORDER — OXYTOCIN/0.9 % SODIUM CHLORIDE 30/500 ML
340 PLASTIC BAG, INJECTION (ML) INTRAVENOUS CONTINUOUS PRN
Status: CANCELLED | OUTPATIENT
Start: 2023-01-08

## 2023-01-08 RX ORDER — BUPIVACAINE HYDROCHLORIDE 7.5 MG/ML
INJECTION, SOLUTION INTRASPINAL
Status: COMPLETED | OUTPATIENT
Start: 2023-01-08 | End: 2023-01-08

## 2023-01-08 RX ORDER — SODIUM CHLORIDE, SODIUM LACTATE, POTASSIUM CHLORIDE, CALCIUM CHLORIDE 600; 310; 30; 20 MG/100ML; MG/100ML; MG/100ML; MG/100ML
INJECTION, SOLUTION INTRAVENOUS
Status: DISCONTINUED
Start: 2023-01-08 | End: 2023-01-08 | Stop reason: HOSPADM

## 2023-01-08 RX ORDER — ONDANSETRON 2 MG/ML
4 INJECTION INTRAMUSCULAR; INTRAVENOUS EVERY 6 HOURS PRN
Status: DISCONTINUED | OUTPATIENT
Start: 2023-01-08 | End: 2023-01-09 | Stop reason: HOSPADM

## 2023-01-08 RX ORDER — KETOROLAC TROMETHAMINE 30 MG/ML
INJECTION, SOLUTION INTRAMUSCULAR; INTRAVENOUS PRN
Status: DISCONTINUED | OUTPATIENT
Start: 2023-01-08 | End: 2023-01-08

## 2023-01-08 RX ORDER — NICOTINE POLACRILEX 4 MG
15-30 LOZENGE BUCCAL
Status: DISCONTINUED | OUTPATIENT
Start: 2023-01-08 | End: 2023-01-09 | Stop reason: HOSPADM

## 2023-01-08 RX ORDER — LEVOTHYROXINE SODIUM 112 UG/1
112 TABLET ORAL DAILY
Status: DISCONTINUED | OUTPATIENT
Start: 2023-01-08 | End: 2023-01-09

## 2023-01-08 RX ORDER — NALBUPHINE HYDROCHLORIDE 10 MG/ML
2.5-5 INJECTION, SOLUTION INTRAMUSCULAR; INTRAVENOUS; SUBCUTANEOUS EVERY 6 HOURS PRN
Status: DISCONTINUED | OUTPATIENT
Start: 2023-01-08 | End: 2023-01-09

## 2023-01-08 RX ORDER — DEXTROSE MONOHYDRATE 25 G/50ML
25-50 INJECTION, SOLUTION INTRAVENOUS
Status: DISCONTINUED | OUTPATIENT
Start: 2023-01-08 | End: 2023-01-09 | Stop reason: HOSPADM

## 2023-01-08 RX ORDER — MORPHINE SULFATE 1 MG/ML
INJECTION, SOLUTION EPIDURAL; INTRATHECAL; INTRAVENOUS
Status: COMPLETED | OUTPATIENT
Start: 2023-01-08 | End: 2023-01-08

## 2023-01-08 RX ORDER — FENTANYL CITRATE 50 UG/ML
INJECTION, SOLUTION INTRAMUSCULAR; INTRAVENOUS
Status: COMPLETED | OUTPATIENT
Start: 2023-01-08 | End: 2023-01-08

## 2023-01-08 RX ORDER — ACETAMINOPHEN 325 MG/1
975 TABLET ORAL ONCE
Status: CANCELLED | OUTPATIENT
Start: 2023-01-08 | End: 2023-01-08

## 2023-01-08 RX ORDER — OXYTOCIN 10 [USP'U]/ML
10 INJECTION, SOLUTION INTRAMUSCULAR; INTRAVENOUS
Status: CANCELLED | OUTPATIENT
Start: 2023-01-08

## 2023-01-08 RX ADMIN — OXYTOCIN-SODIUM CHLORIDE 0.9% IV SOLN 30 UNIT/500ML 300 ML/HR: 30-0.9/5 SOLUTION at 22:43

## 2023-01-08 RX ADMIN — PHENYLEPHRINE HYDROCHLORIDE 75 MCG/MIN: 10 INJECTION INTRAVENOUS at 22:15

## 2023-01-08 RX ADMIN — PHENYLEPHRINE HYDROCHLORIDE 100 MCG: 10 INJECTION INTRAVENOUS at 22:54

## 2023-01-08 RX ADMIN — ONDANSETRON 4 MG: 2 INJECTION INTRAMUSCULAR; INTRAVENOUS at 22:01

## 2023-01-08 RX ADMIN — KETOROLAC TROMETHAMINE 30 MG: 30 INJECTION, SOLUTION INTRAMUSCULAR at 23:16

## 2023-01-08 RX ADMIN — TRANEXAMIC ACID 1 G: 10 INJECTION, SOLUTION INTRAVENOUS at 22:36

## 2023-01-08 RX ADMIN — PENICILLIN G 3 MILLION UNITS: 3000000 INJECTION, SOLUTION INTRAVENOUS at 06:37

## 2023-01-08 RX ADMIN — SODIUM CHLORIDE, POTASSIUM CHLORIDE, SODIUM LACTATE AND CALCIUM CHLORIDE 125 ML/HR: 600; 310; 30; 20 INJECTION, SOLUTION INTRAVENOUS at 03:38

## 2023-01-08 RX ADMIN — FENTANYL CITRATE 15 MCG: 50 INJECTION, SOLUTION INTRAMUSCULAR; INTRAVENOUS at 22:13

## 2023-01-08 RX ADMIN — BUPIVACAINE HYDROCHLORIDE IN DEXTROSE 1.6 ML: 7.5 INJECTION, SOLUTION SUBARACHNOID at 22:13

## 2023-01-08 RX ADMIN — PENICILLIN G 3 MILLION UNITS: 3000000 INJECTION, SOLUTION INTRAVENOUS at 19:13

## 2023-01-08 RX ADMIN — MORPHINE SULFATE 0.15 MG: 1 INJECTION EPIDURAL; INTRATHECAL; INTRAVENOUS at 22:13

## 2023-01-08 RX ADMIN — METHYLERGONOVINE MALEATE 200 MCG: 0.2 INJECTION INTRAVENOUS at 22:52

## 2023-01-08 RX ADMIN — MISOPROSTOL 800 MCG: 200 TABLET ORAL at 23:30

## 2023-01-08 RX ADMIN — METHYLERGONOVINE MALEATE 200 MCG: 0.2 INJECTION INTRAVENOUS at 22:45

## 2023-01-08 RX ADMIN — LEVOTHYROXINE SODIUM 112 MCG: 0.11 TABLET ORAL at 11:01

## 2023-01-08 RX ADMIN — SODIUM CHLORIDE, POTASSIUM CHLORIDE, SODIUM LACTATE AND CALCIUM CHLORIDE: 600; 310; 30; 20 INJECTION, SOLUTION INTRAVENOUS at 21:52

## 2023-01-08 RX ADMIN — PENICILLIN G 3 MILLION UNITS: 3000000 INJECTION, SOLUTION INTRAVENOUS at 14:46

## 2023-01-08 RX ADMIN — Medication 2 MILLI-UNITS/MIN: at 17:52

## 2023-01-08 RX ADMIN — PENICILLIN G 3 MILLION UNITS: 3000000 INJECTION, SOLUTION INTRAVENOUS at 10:54

## 2023-01-08 RX ADMIN — AZITHROMYCIN MONOHYDRATE 500 MG: 500 INJECTION, POWDER, LYOPHILIZED, FOR SOLUTION INTRAVENOUS at 21:52

## 2023-01-08 RX ADMIN — PENICILLIN G 3 MILLION UNITS: 3000000 INJECTION, SOLUTION INTRAVENOUS at 02:34

## 2023-01-08 RX ADMIN — BUPIVACAINE 20 ML: 13.3 INJECTION, SUSPENSION, LIPOSOMAL INFILTRATION at 23:40

## 2023-01-08 RX ADMIN — SODIUM CHLORIDE, POTASSIUM CHLORIDE, SODIUM LACTATE AND CALCIUM CHLORIDE: 600; 310; 30; 20 INJECTION, SOLUTION INTRAVENOUS at 17:53

## 2023-01-08 RX ADMIN — PHENYLEPHRINE HYDROCHLORIDE 100 MCG: 10 INJECTION INTRAVENOUS at 22:51

## 2023-01-08 RX ADMIN — SODIUM CITRATE AND CITRIC ACID MONOHYDRATE 30 ML: 500; 334 SOLUTION ORAL at 21:43

## 2023-01-08 RX ADMIN — BUPIVACAINE HYDROCHLORIDE 20 ML: 2.5 INJECTION, SOLUTION EPIDURAL; INFILTRATION; INTRACAUDAL at 23:40

## 2023-01-08 RX ADMIN — CEFAZOLIN 3 G: 1 INJECTION, POWDER, FOR SOLUTION INTRAMUSCULAR; INTRAVENOUS at 22:14

## 2023-01-08 ASSESSMENT — ACTIVITIES OF DAILY LIVING (ADL)
ADLS_ACUITY_SCORE: 20

## 2023-01-08 NOTE — PROVIDER NOTIFICATION
01/08/23 1225   Provider Notification   Provider Name/Title Dr. Hansen   Method of Notification At Bedside   Request Evaluate in Person   Notification Reason Status Update   Dr. Hansen at bedside discussing FSE and IUPC as options for improved monitoring. Patient agreeable to FSE and IUPC. Questions encouraged and answered.

## 2023-01-08 NOTE — PROGRESS NOTES
[facility]  Labor Progress Note    S:  Patient feeling comfortable despite contractions.     O:   Patient Vitals for the past 4 hrs:   BP Temp Temp src Resp   23 1945 107/66 98.2  F (36.8  C) Oral 16     SVE: Deferred per patient request.     FHT: Baseline 140, mod variability, pos accelerations, no decelerations  [place]: 2-3 contractions in 10 minutes    A/P:  Ms. Dayana Whalen is a 42 year old  at 41w5d here as MEETA from [facility]     Labor: - Induction for GDMA1. S/p Quijano with [facility]. Now on Pitocin - running at 2 mU/min. Patient amenable to going up on Pit.   FWB: - Category I FHT. On Demetria, difficult to trace at times.   PNC: - Rh positive, Rubella immune, GBS positive - now adequate on PCN    Late entry secondary to patient care.     Zahida Cody MD  OB/GYN PGY-3  23 9:15 PM

## 2023-01-08 NOTE — PROGRESS NOTES
[facility]  FHT Strip Review Note    O:   Patient Vitals for the past 4 hrs:   BP Temp Temp src Resp   23 0340 108/64 98.3  F (36.8  C) Oral 16   23 0235 128/72 -- -- --   23 0120 106/60 -- -- --     FHT: Baseline 140, mod variability, pos accelerations, no decelerations  [place]: 2-3 contractions in 10 minutes    A/P:  Ms. Dayana Whalen is a 42 year old  at 41w5d here as [facility]ET[facility] from [place]     Labor: - Induction for GDMA1. S/p Quijano with [facility]. Now on Pitocin - running at 14 mU/min. Patient continues to report not feeling uncomfortable per RN report. Sleeping, declines cervical exam.   GDMA1 - mSSI, Q2h BG given high rates of Pitocin   FWB: - Category I FHT. On Demetria, difficult to trace at times.   PNC: - Rh positive, Rubella immune, GBS positive - now adequate on PCN    Late entry secondary to patient care.     Zahida Coyd MD  OB/GYN PGY-3  23 0100

## 2023-01-08 NOTE — PLAN OF CARE
Patient VSS. Patient denies vision changes, RUQ pain, N/V, and bleeding. See flow sheets for uterine activity and FHR. Patient voiding and up ad sylvia. Patient coping with labor via position changes, breathing exercising, , and partner. Patient states she is coping. Bedside handoff to Jerome who is assuming care.

## 2023-01-08 NOTE — PROGRESS NOTES
[facility]  Labor Progress Note    S: Patient awake at this time. Feels occasional menstrual-like cramps but otherwise not uncomfortable. Occasional tightening to the abdomen as well. Does not want SVE at this time given lack of labor symptoms. Would like to discuss steps such as AROM with  prior to proceeding.     O:   Patient Vitals for the past 4 hrs:   BP Temp Temp src Resp   23 0340 108/64 98.3  F (36.8  C) Oral 16   23 0235 128/72 -- -- --   Gen: Well appearing, no distress   FHT: Baseline 140, mod variability, pos accelerations, no decelerations  [place]: 2-3 contractions in 10 minutes    A/P:  Ms. Dayana Whalen is a 42 year old  at 41w5d here as [facility] from [facility]     Labor: - Induction for GDMA1. S/p Quijano with [facility]. Now on Pitocin - running at 20 mU/min. Patient does not appear to be in labor at this time, wants to avoid SVE. Discussed tentative plan for SVE with assessment for AROM later this morning. Patient would like to discuss with  before proceeding.   GDMA1 - mSSI, Q2h BG given high rates of Pitocin   FWB: - Category I FHT.    PNC: - Rh positive, Rubella immune, GBS positive - now adequate on PCN    Zahida Cody MD  OB/GYN PGY-3  23 5:46 AM

## 2023-01-08 NOTE — PLAN OF CARE
IOL for GDM, AMA, and post-dates. Patient came from home, states no pain with contractions. Pitocin started at 1730. GBS positive, started on penicillin. See flow sheet for contraction pattern and FHR.   Goal Outcome Evaluation:      Plan of Care Reviewed With: patient, spouse    Overall Patient Progress: no change

## 2023-01-08 NOTE — PROVIDER NOTIFICATION
01/08/23 1438   Provider Notification   Provider Name/Title Dr. Hansen   Method of Notification Phone   Request Evaluate - Remote   Notification Reason Labor Status     Provider updated on MVU and labor status. Dr. Hansen ordered to increase pitocin maximum beyond 24. Patient informed of adjusted order and declined pitocin being increased. Patient stated she will let nursing know if she is open to increasing pitocin.

## 2023-01-08 NOTE — PROVIDER NOTIFICATION
01/08/23 1045   Provider Notification   Provider Name/Title Dr. Pfeiffer   Method of Notification At Bedside   Notification Reason Labor Status     Dr. Pfeiffer at bedside discussing labor status with patient. Patient expressed interest in AROM. Plan is to do AROM once patient returns to bed.

## 2023-01-08 NOTE — PROGRESS NOTES
[facility]  FHT Strip Review Note    Patient Vitals for the past 4 hrs:   BP Temp Temp src   23 1525 -- 97.5  F (36.4  C) Oral   23 1411 103/56 -- --   23 1408 -- 98.1  F (36.7  C) Axillary   23 1245 -- 97.9  F (36.6  C) Oral   23 1219 -- 98.3  F (36.8  C) --     FHT: Baseline 125, mod variability, + accelerations, no decelerations  [place]: 3 contractions in 10 minutes, MVUs 70    A/P:  Ms. Dayana Whalen is a 42 year old  at 41w6d by LMP, here for IOL for post-dates. She was transferred from [facility]    Labor   - In active labor at 6cm now  - AROM   Membranes: AROM (1132, clear)   Pain: Declines pain interventions at this time  Plan: Continue augmentation with pitocin, IUPC and FSE placed to improve tracing so patient can remain mobile    # FWB  Category I FHT    Duncan Hansen MD  OB/GYN PGY-3  2023 5:43 PM

## 2023-01-08 NOTE — PROGRESS NOTES
[facility]  Labor Progress Note    S:  Patient doing well, feeling more intensity with contractions. Very uncomfortable in certain positions and frustrated with limited mobility and difficulty tracing.     O:   Patient Vitals for the past 4 hrs:   BP Temp Temp src Resp   23 1009 115/72 98.1  F (36.7  C) Oral 16     SVE: 50/-2  FHT: Baseline 130, mod variability, + accelerations, no decelerations  [place]: 3 contractions in 10 minutes    A/P:  Ms. Dayana Whalen is a 42 year old  at 41w6d by LMP, here for IOL for post-dates.    # Labor   SVE: /-2  Membranes: AROM (1132, clear)   Pain: Declines pain interventions at this time  Plan: Continue augmentation with pitocin, IUPC and FSE placed to improve tracing so patient can remain mobile    # FWB  Category I FHT    Alva Lowry MD  OB/GYN Resident PGY-1  12:39 PM 2023

## 2023-01-08 NOTE — PROGRESS NOTES
[facility]  FHT Strip Review Note    S: Discussed care plan with RN. Patient continues to sleep comfortably despite high rates of Pitocin. Sleeping comfortably at this time.     O:   Patient Vitals for the past 4 hrs:   BP Temp Temp src Resp   23 0340 108/64 98.3  F (36.8  C) Oral 16   23 0235 128/72 -- -- --   23 0120 106/60 -- -- --     FHT: Baseline 140, mod variability, pos accelerations, no decelerations  [place]: 2-3 contractions in 10 minutes    A/P:  Ms. Dayana Whalen is a 42 year old  at 41w5d here as [facility] from [facility]     Labor: - Induction for GDMA1. S/p Quijano with [place] [facility]. Now on Pitocin - running at 20 mU/min. Patient continues to report not feeling uncomfortable per RN report. Sleeping, declines cervical exam. Plan SVE ~0600 to discuss AROM.    GDMA1 - mSSI, Q2h BG given high rates of Pitocin   FWB: - Category I FHT. On Demetria, difficult to trace at times.   PNC: - Rh positive, Rubella immune, GBS positive - now adequate on PCN    Late entry secondary to patient care.     Zahida Cody MD  OB/GYN PGY-3  23 9190

## 2023-01-08 NOTE — PROGRESS NOTES
OB Staff Labor Note  S: Not really feeling contractions, pitocin at 22 munits/min. Has sacral tenderness from being in bed. Sitting on toilet now with relief of some of this pressure.  She is open to AROM this morning.    O:   Patient Vitals for the past 4 hrs:   BP Temp Temp src Resp   23 0800 113/58 98.1  F (36.7  C) Oral 16   ]  Gen'l: no acute distress  CV: RRR  Resp: non-labored respirations    SVE (deferred to AROM)    FHT: baseline 130, moderate variability, accels not present currently, no decels present  [place]: difficult to assess, per RN can palpate and last approximately 70-80 s    Recent Labs   Lab 23  2100 23  1652   GLC 97 78       A/P:  41 yo  at 42w1d by LMP (no dating us) MEETA from [facility] for postdates IOL  - not in active labor despite pitocin at 22 munits/min.  Patient agreeable to AROM, discussed r/b/a.  She expresses increasing fatigue and hoping to keep process moveing  - A1GDM, q 2hour glucose checks  - GBS + on PCN>4 hours  - Fetal status reassuring, category 1 tracing  - will return for AROM when patient back in bed, try donut pillow for relief of sacral pressure.    Lois Pfeiffer MD

## 2023-01-08 NOTE — PROGRESS NOTES
[facility]  Labor Progress Note    S: feeling well, not feeling contractions. Has been able to rest    O:  Patient Vitals for the past 4 hrs:   BP Temp Temp src Resp   23 1626 -- 97.7  F (36.5  C) Oral --   23 1525 -- 97.5  F (36.4  C) Oral 16   23 1411 103/56 -- -- --   23 1408 -- 98.1  F (36.7  C) Axillary --     Gen: alert, NAD  SVE: patient declined    FHT: Baseline 125, mod variability, + accelerations, no decelerations  [place]: 0 contractions in 10 minutes    A/P:  Dayana Whalen is a 42 year old  at 41w6d by LMP, here for IOL for post-dates. She was transferred from [facility]. Pregnancy complicated by GDMA1    Labor   - In active labor at 6cm now. It has now been 6 hours of active labor. We discussed recommendation for a cervical exam to assess for change in active labor. We discussed that at 6 hours we would expect cervical change, otherwise we would discuss CS. At this time, she is declining a cervical exam. She would like to discuss it with her partner and [place] first. Will check in with her after that. She would like to restart pitocin now to try to get her contractions going again.  - s/p pit break, restart now  - Membranes: s/p AROM (1132, clear)   - Pain: per patient request, nothing at this time    GDMA1  - follow active labor management protocol  - BG q2h    # FWB  Category I FHT    Duncan Hansen MD  OB/GYN PGY-3  2023 5:43 PM

## 2023-01-08 NOTE — PROVIDER NOTIFICATION
01/08/23 1128   Provider Notification   Provider Name/Title Dr. Pfeiffer   Method of Notification At Bedside   Notification Reason Membrane Status;Status Update;SVE     Dr. Pfeiffer at bedside for AROM. Clear fluid, SVE 6/50/-2.

## 2023-01-08 NOTE — PLAN OF CARE
VSS. Reactive FHR with baseline of 110-120s. See flow sheets for more details. Pt denies feeling pain, LOF, or headaches. Pitocin was titrated up to 20 milliunits/min and stays running at that rate. Next Penicillin dose is to be administered at 0630. Overnight, pt has been anxious about not making progress towards more active labor. Reassurance and emotional support was provided.

## 2023-01-08 NOTE — PROGRESS NOTES
OB Staff AROM    Patient ready for AROM    SVE: 5-50-2, after AROM /-2, clear fluid noted    FHT: 130, moderate variability, acells present, no decels  [place]: 3-4 in 10 minutes    - continue pitocin  - continuous fetal monitoring  - anticipate     Lois Pfeiffer MD

## 2023-01-09 LAB
CREAT SERPL-MCNC: 0.68 MG/DL (ref 0.52–1.04)
GFR SERPL CREATININE-BSD FRML MDRD: >90 ML/MIN/1.73M2
HGB BLD-MCNC: 8.5 G/DL (ref 11.7–15.7)

## 2023-01-09 PROCEDURE — 85018 HEMOGLOBIN: CPT | Performed by: STUDENT IN AN ORGANIZED HEALTH CARE EDUCATION/TRAINING PROGRAM

## 2023-01-09 PROCEDURE — 250N000011 HC RX IP 250 OP 636: Performed by: STUDENT IN AN ORGANIZED HEALTH CARE EDUCATION/TRAINING PROGRAM

## 2023-01-09 PROCEDURE — 120N000002 HC R&B MED SURG/OB UMMC

## 2023-01-09 PROCEDURE — 82565 ASSAY OF CREATININE: CPT | Performed by: STUDENT IN AN ORGANIZED HEALTH CARE EDUCATION/TRAINING PROGRAM

## 2023-01-09 PROCEDURE — 250N000013 HC RX MED GY IP 250 OP 250 PS 637: Performed by: STUDENT IN AN ORGANIZED HEALTH CARE EDUCATION/TRAINING PROGRAM

## 2023-01-09 PROCEDURE — 36415 COLL VENOUS BLD VENIPUNCTURE: CPT | Performed by: STUDENT IN AN ORGANIZED HEALTH CARE EDUCATION/TRAINING PROGRAM

## 2023-01-09 PROCEDURE — 250N000013 HC RX MED GY IP 250 OP 250 PS 637

## 2023-01-09 RX ORDER — PROCHLORPERAZINE MALEATE 10 MG
10 TABLET ORAL EVERY 6 HOURS PRN
Status: DISCONTINUED | OUTPATIENT
Start: 2023-01-09 | End: 2023-01-10 | Stop reason: HOSPADM

## 2023-01-09 RX ORDER — OXYTOCIN/0.9 % SODIUM CHLORIDE 30/500 ML
340 PLASTIC BAG, INJECTION (ML) INTRAVENOUS CONTINUOUS PRN
Status: DISCONTINUED | OUTPATIENT
Start: 2023-01-09 | End: 2023-01-10 | Stop reason: HOSPADM

## 2023-01-09 RX ORDER — IBUPROFEN 800 MG/1
800 TABLET, FILM COATED ORAL EVERY 6 HOURS
Status: DISCONTINUED | OUTPATIENT
Start: 2023-01-09 | End: 2023-01-10 | Stop reason: HOSPADM

## 2023-01-09 RX ORDER — ENOXAPARIN SODIUM 100 MG/ML
40 INJECTION SUBCUTANEOUS EVERY 12 HOURS
Status: DISCONTINUED | OUTPATIENT
Start: 2023-01-09 | End: 2023-01-10 | Stop reason: HOSPADM

## 2023-01-09 RX ORDER — OXYCODONE HYDROCHLORIDE 5 MG/1
5 TABLET ORAL EVERY 4 HOURS PRN
Status: DISCONTINUED | OUTPATIENT
Start: 2023-01-09 | End: 2023-01-10 | Stop reason: HOSPADM

## 2023-01-09 RX ORDER — BISACODYL 10 MG
10 SUPPOSITORY, RECTAL RECTAL DAILY PRN
Status: DISCONTINUED | OUTPATIENT
Start: 2023-01-10 | End: 2023-01-10 | Stop reason: HOSPADM

## 2023-01-09 RX ORDER — LIDOCAINE 40 MG/G
CREAM TOPICAL
Status: DISCONTINUED | OUTPATIENT
Start: 2023-01-09 | End: 2023-01-10 | Stop reason: HOSPADM

## 2023-01-09 RX ORDER — OXYTOCIN/0.9 % SODIUM CHLORIDE 30/500 ML
100-340 PLASTIC BAG, INJECTION (ML) INTRAVENOUS CONTINUOUS PRN
Status: DISCONTINUED | OUTPATIENT
Start: 2023-01-09 | End: 2023-01-10 | Stop reason: HOSPADM

## 2023-01-09 RX ORDER — MODIFIED LANOLIN
OINTMENT (GRAM) TOPICAL
Status: DISCONTINUED | OUTPATIENT
Start: 2023-01-09 | End: 2023-01-10 | Stop reason: HOSPADM

## 2023-01-09 RX ORDER — HYDROCORTISONE 25 MG/G
CREAM TOPICAL 3 TIMES DAILY PRN
Status: DISCONTINUED | OUTPATIENT
Start: 2023-01-09 | End: 2023-01-10 | Stop reason: HOSPADM

## 2023-01-09 RX ORDER — KETOROLAC TROMETHAMINE 30 MG/ML
30 INJECTION, SOLUTION INTRAMUSCULAR; INTRAVENOUS EVERY 6 HOURS
Status: COMPLETED | OUTPATIENT
Start: 2023-01-09 | End: 2023-01-09

## 2023-01-09 RX ORDER — METOCLOPRAMIDE 10 MG/1
10 TABLET ORAL EVERY 6 HOURS PRN
Status: DISCONTINUED | OUTPATIENT
Start: 2023-01-09 | End: 2023-01-10 | Stop reason: HOSPADM

## 2023-01-09 RX ORDER — LEVOTHYROXINE SODIUM 112 UG/1
112 TABLET ORAL DAILY
Status: DISCONTINUED | OUTPATIENT
Start: 2023-01-09 | End: 2023-01-10 | Stop reason: HOSPADM

## 2023-01-09 RX ORDER — AMOXICILLIN 250 MG
1 CAPSULE ORAL 2 TIMES DAILY
Status: DISCONTINUED | OUTPATIENT
Start: 2023-01-09 | End: 2023-01-10 | Stop reason: HOSPADM

## 2023-01-09 RX ORDER — MISOPROSTOL 200 UG/1
800 TABLET ORAL
Status: DISCONTINUED | OUTPATIENT
Start: 2023-01-09 | End: 2023-01-10 | Stop reason: HOSPADM

## 2023-01-09 RX ORDER — OXYTOCIN 10 [USP'U]/ML
10 INJECTION, SOLUTION INTRAMUSCULAR; INTRAVENOUS
Status: DISCONTINUED | OUTPATIENT
Start: 2023-01-09 | End: 2023-01-10 | Stop reason: HOSPADM

## 2023-01-09 RX ORDER — PROCHLORPERAZINE 25 MG
25 SUPPOSITORY, RECTAL RECTAL EVERY 12 HOURS PRN
Status: DISCONTINUED | OUTPATIENT
Start: 2023-01-09 | End: 2023-01-10 | Stop reason: HOSPADM

## 2023-01-09 RX ORDER — ACETAMINOPHEN 325 MG/1
975 TABLET ORAL EVERY 6 HOURS
Status: DISCONTINUED | OUTPATIENT
Start: 2023-01-09 | End: 2023-01-10 | Stop reason: HOSPADM

## 2023-01-09 RX ORDER — NALOXONE HYDROCHLORIDE 0.4 MG/ML
0.2 INJECTION, SOLUTION INTRAMUSCULAR; INTRAVENOUS; SUBCUTANEOUS
Status: DISCONTINUED | OUTPATIENT
Start: 2023-01-09 | End: 2023-01-10 | Stop reason: HOSPADM

## 2023-01-09 RX ORDER — DIPHENHYDRAMINE HYDROCHLORIDE 50 MG/ML
25 INJECTION INTRAMUSCULAR; INTRAVENOUS EVERY 6 HOURS PRN
Status: DISCONTINUED | OUTPATIENT
Start: 2023-01-09 | End: 2023-01-10 | Stop reason: HOSPADM

## 2023-01-09 RX ORDER — ONDANSETRON 4 MG/1
4 TABLET, ORALLY DISINTEGRATING ORAL EVERY 6 HOURS PRN
Status: DISCONTINUED | OUTPATIENT
Start: 2023-01-09 | End: 2023-01-10 | Stop reason: HOSPADM

## 2023-01-09 RX ORDER — ONDANSETRON 2 MG/ML
4 INJECTION INTRAMUSCULAR; INTRAVENOUS EVERY 6 HOURS PRN
Status: DISCONTINUED | OUTPATIENT
Start: 2023-01-09 | End: 2023-01-10 | Stop reason: HOSPADM

## 2023-01-09 RX ORDER — NALOXONE HYDROCHLORIDE 0.4 MG/ML
0.4 INJECTION, SOLUTION INTRAMUSCULAR; INTRAVENOUS; SUBCUTANEOUS
Status: DISCONTINUED | OUTPATIENT
Start: 2023-01-09 | End: 2023-01-10 | Stop reason: HOSPADM

## 2023-01-09 RX ORDER — CARBOPROST TROMETHAMINE 250 UG/ML
250 INJECTION, SOLUTION INTRAMUSCULAR
Status: DISCONTINUED | OUTPATIENT
Start: 2023-01-09 | End: 2023-01-10 | Stop reason: HOSPADM

## 2023-01-09 RX ORDER — DEXTROSE, SODIUM CHLORIDE, SODIUM LACTATE, POTASSIUM CHLORIDE, AND CALCIUM CHLORIDE 5; .6; .31; .03; .02 G/100ML; G/100ML; G/100ML; G/100ML; G/100ML
INJECTION, SOLUTION INTRAVENOUS CONTINUOUS
Status: DISCONTINUED | OUTPATIENT
Start: 2023-01-09 | End: 2023-01-10 | Stop reason: HOSPADM

## 2023-01-09 RX ORDER — METHYLERGONOVINE MALEATE 0.2 MG/ML
200 INJECTION INTRAVENOUS
Status: DISCONTINUED | OUTPATIENT
Start: 2023-01-09 | End: 2023-01-10 | Stop reason: HOSPADM

## 2023-01-09 RX ORDER — AMOXICILLIN 250 MG
2 CAPSULE ORAL 2 TIMES DAILY
Status: DISCONTINUED | OUTPATIENT
Start: 2023-01-09 | End: 2023-01-10 | Stop reason: HOSPADM

## 2023-01-09 RX ORDER — SIMETHICONE 80 MG
80 TABLET,CHEWABLE ORAL 4 TIMES DAILY PRN
Status: DISCONTINUED | OUTPATIENT
Start: 2023-01-09 | End: 2023-01-10 | Stop reason: HOSPADM

## 2023-01-09 RX ORDER — DIPHENHYDRAMINE HCL 25 MG
25 CAPSULE ORAL EVERY 6 HOURS PRN
Status: DISCONTINUED | OUTPATIENT
Start: 2023-01-09 | End: 2023-01-10 | Stop reason: HOSPADM

## 2023-01-09 RX ORDER — METOCLOPRAMIDE HYDROCHLORIDE 5 MG/ML
10 INJECTION INTRAMUSCULAR; INTRAVENOUS EVERY 6 HOURS PRN
Status: DISCONTINUED | OUTPATIENT
Start: 2023-01-09 | End: 2023-01-10 | Stop reason: HOSPADM

## 2023-01-09 RX ORDER — TRANEXAMIC ACID 10 MG/ML
1 INJECTION, SOLUTION INTRAVENOUS EVERY 30 MIN PRN
Status: DISCONTINUED | OUTPATIENT
Start: 2023-01-09 | End: 2023-01-10 | Stop reason: HOSPADM

## 2023-01-09 RX ORDER — MISOPROSTOL 200 UG/1
400 TABLET ORAL
Status: DISCONTINUED | OUTPATIENT
Start: 2023-01-09 | End: 2023-01-10 | Stop reason: HOSPADM

## 2023-01-09 RX ADMIN — SENNOSIDES AND DOCUSATE SODIUM 1 TABLET: 50; 8.6 TABLET ORAL at 20:22

## 2023-01-09 RX ADMIN — KETOROLAC TROMETHAMINE 30 MG: 30 INJECTION, SOLUTION INTRAMUSCULAR; INTRAVENOUS at 10:36

## 2023-01-09 RX ADMIN — NALBUPHINE HYDROCHLORIDE 5 MG: 10 INJECTION, SOLUTION INTRAMUSCULAR; INTRAVENOUS; SUBCUTANEOUS at 00:58

## 2023-01-09 RX ADMIN — LEVOTHYROXINE SODIUM 112 MCG: 0.11 TABLET ORAL at 08:30

## 2023-01-09 RX ADMIN — KETOROLAC TROMETHAMINE 30 MG: 30 INJECTION, SOLUTION INTRAMUSCULAR; INTRAVENOUS at 04:46

## 2023-01-09 RX ADMIN — ACETAMINOPHEN 975 MG: 325 TABLET, FILM COATED ORAL at 19:15

## 2023-01-09 RX ADMIN — ENOXAPARIN SODIUM 40 MG: 40 INJECTION SUBCUTANEOUS at 22:24

## 2023-01-09 RX ADMIN — ACETAMINOPHEN 975 MG: 325 TABLET, FILM COATED ORAL at 13:09

## 2023-01-09 RX ADMIN — IBUPROFEN 800 MG: 800 TABLET, FILM COATED ORAL at 23:37

## 2023-01-09 RX ADMIN — KETOROLAC TROMETHAMINE 30 MG: 30 INJECTION, SOLUTION INTRAMUSCULAR; INTRAVENOUS at 17:41

## 2023-01-09 RX ADMIN — SENNOSIDES AND DOCUSATE SODIUM 2 TABLET: 8.6; 5 TABLET ORAL at 08:30

## 2023-01-09 RX ADMIN — ENOXAPARIN SODIUM 40 MG: 40 INJECTION SUBCUTANEOUS at 10:36

## 2023-01-09 RX ADMIN — METOCLOPRAMIDE 10 MG: 10 TABLET ORAL at 04:47

## 2023-01-09 RX ADMIN — ACETAMINOPHEN 975 MG: 325 TABLET, FILM COATED ORAL at 06:35

## 2023-01-09 ASSESSMENT — ACTIVITIES OF DAILY LIVING (ADL)
ADLS_ACUITY_SCORE: 20
ADLS_ACUITY_SCORE: 21
ADLS_ACUITY_SCORE: 20

## 2023-01-09 NOTE — PROGRESS NOTES
[facility]  Labor Progress Note    S:Overall very fatigued and frustrated with this whole process. Still trying to have vaginal delivery if possible.     O:  Patient Vitals for the past 4 hrs:   BP Temp Temp src Resp   23 1850 -- 98.3  F (36.8  C) Oral --   23 1810 106/59 -- -- --   23 1743 -- 97.9  F (36.6  C) Oral 16   23 1626 -- 97.7  F (36.5  C) Oral --   23 1525 -- 97.5  F (36.4  C) Oral 16     Gen: alert, NAD  SVE: /-2, IUPC replaced     FHT: Baseline 120, mod variability, + accelerations, no decelerations  [place]: 3 contractions in 10 minutes; currently 60 MVU    A/P:  Dayana Whalen is a 42 year old  at 41w6d by LMP, here for IOL for post-dates. She was transferred from [facility]. Pregnancy complicated by GDMA1    Labor   -Currently cervix still at 6cm. Patient had been declining increasing pitocin. Restarted at 1752 and currently at 6 units. Patient overall comfortable. Discussed that technically patient meets criteria for arrest of dilation and recommendation would be to proceed with primary  section. Discussed that this is not a failure on her end and that she has been working very hard.  Patient declines  section discussion at this time and would like more time, now that the pitocin is back on. Patient agreeable for an exam in 2 hours to reassess for change. Currently contractions inadequate.   - s/p pit break, restarted at 1800. Currently at 6u. Will continue to titrate  - Patient will attempt continued position changes.   - Membranes: s/p AROM (1132, clear)   - Pain: per patient request, nothing at this time    GDMA1  - follow active labor management protocol  - BG q2h    # FWB  Category I FHT    Negrita Ortiz MD PGY2

## 2023-01-09 NOTE — PLAN OF CARE
Goal Outcome Evaluation:  Patient is stable and rates pain at 2/10. and She's been ambulating well in the room and hallways and noticed that she's starting to pass gas. No void 4 hrs post slaughter and per bladder scan she  has only 45 ml.  Encouraged to drink more and she has been moving morre in the room. Voided this evening with 150 ml out.  Bonding well with baby and breast feeding with minimal assist. Will continue with plan of care.

## 2023-01-09 NOTE — OP NOTE
[facility]  Operative Note    Name: Dayana Whalen  MRN: [id number]  : 1980  Date of Surgery: 2023    Surgeon:  - Dr. Michelle Hughes    Assistants:  - Negrita Ortiz MD, PGY2  - Candie Phillips MD, PGY3    Pre-operative Diagnosis:  - IUP @ 41w6d  - Arrest of dilation  - Morbid obesity  - GDMA1  - Hypothyroidism    Post-operative Diagnosis:  - Postpartum s/p delivery of viable male infant  - PPH    Procedure(s):   - Primary low transverse  section with double layer uterine closure via Pfannenstiel skin incision    Anesthesia: Spinal  QBL: 1200 mL  Fluids: 1000 mL crystalloid  Additional Medications: 3g Ancef preop, 1g IV TXA, 200mg IM methergine, Miso 800 mcg MD  Specimens: Cord blood  Complications: None apparent    Findings:   - Viable male infant delivered at 2241 on 2023 with APGARs 9 and 9. Weight pending.   - Cord gases: not sent.   - Clear amniotic fluid, Placenta intact.   - Normal uterus, tubes, and ovaries.   - No adhesions.   - Surgical sites noted to be hemostatic at the end of case.     Indications: Dayana Whalen is a 42 year old  who presented at 41w6d after transfer of care from [facility] for prolonged labor and IOL for GDMA1. Her cervix was 4 cm at the time of transfer and did not progress beyond 6cm despite AROM and augmentation with pitocin. A  section was recommended. Risks and benefits were reviewed and the patient consented for the procedure.    Procedure Details: The patient was taken back to the operating room where she underwent spinal anesthesia. She was prepped and draped in the usual sterile fashion in the dorsal supine position with a leftward tilt. A safety patient time out was performed. 3 gm Ancef was administered.     A low transverse skin incision was made with the scalpel and carried through the underlying layer to the fascia. The fascia was incised in the midline and extended laterally with  Evan scissors. The superior aspect of the fascial incision was grasped with kocher clamps, elevated and the underlying rectus muscles dissected off with a combination of blunt and sharp dissection. Attention was then turned to the inferior aspect of the incision, which in a similar fashion was grasped, tented up and the rectus muscle dissected off the fascia. The rectus muscles were  in the midline. The peritoneum was identified and entered bluntly. This was extended with blunt dissection.     A large Kevan O retractor was inserted. The lower uterine segment was incised in a transverse fashion with the scalpel. The incision was extended digitally. The infant was noted to be cephalic and was delivered atraumatically. After 60 seconds delayed cord clamping, the cord was cut, and the infant was handed off to the waiting NICU staff.  A segment of cord was taken for cord gases. The placenta was removed with gentle traction on the cord and fundal message.     The uterus was left in situ and cleared of all clots and debris. The uterine incision was repaired with 0-Vicryl in a running locked fashion. A second layer of 0-monocryl was used to imbricate the incision. Uterine atony was controlled with fundal massage, pitocin, and methergine. The posterior cul-de-sac was cleared of clots and debris. The gutters were cleared of clots.     A kocher clamp was used to elevated the fascia superiorly and inferiorly and good hemostasis was noted. The fascia was closed with looped 0-PDS x2 in a running fashion starting at both angles and tied in the middle. The subcutaneous tissue was irrigated and areas of bleeding were controlled with cautery. The subcutaneous tissue was closed with 3 layers of 2-0 plain gut. The skin was closed with 4-0 monocryl and covered with a sterile dressing. The patient tolerated the procedure well and was taken to the recovery room in stable condition. All lap, instrument, and sharps counts were  correct times two. Dr. Hughes was scrubbed and present for the procedure.     Candie Phillips MD  ObGyn, PGY-3  01/08/2023 11:24 PM    I participated in all aspects of Dayana Whalen's case with Dr. Phillips on 1/8/2023 and agree with the operative details in this note with edits by me.     Michelle Hughes MD MPH

## 2023-01-09 NOTE — LACTATION NOTE
"This note was copied from a baby's chart.  Consult for: First time breastfeeding    Delivery Information: Baby Beck was born at 41.6 weeks via c/s for arrest of dilation on 23 at 2241.    Maternal Health History: Ciara transferred to [facility] from [facility]. Parents share they had \"great\" prenatal breastfeeding education through [employer] and their  service     Ciara has a history of elevated BMI, GDM (diet controlled), depression, hypothyroid (levels well controlled in pregnancy) and AMA at 42 years old.    Ciara noted breast growth and sensitivity in early pregnancy. She denies any history of breast/chest injury or surgery. She has been leaking colostrum and was able to hand express/pump colostrum when hand expressing/pumpig to stimulate labor contractions.   ?  Infant information: Beck has age appropriate output. He was AGA at birth at 8lb 0z. He is <12 hours old.     His blood sugars have been stable.     Recent Labs   Lab 23  0434 23  0226 23  0041 23  2345   GLC 50 46 47 37*       Feeding Assessment: Parents share they recently fed Beck. They share he has been latching well and frequently since birth. He has  x 4.  Ciara denies discomfort with latch.     Parents encouraged to call bedside RN, Resource RN or LC for support with latch and/or latch assessment.    Reviewed risk factors with Ciara and encouraged hand expression with feeding attempts for extra breast stimulation. We discussed recommendations for pumping and she will let us know if she wishes to pump for extra breast stimulation.       Education: early feeding cues, benefits of feeding on cue, breastfeeding positions, signs breastfeeding is going well (comfortable latch, age appropriate output and weight loss, swallowing heard at the breast), satiety cues, expected  output,  weight loss, nutritive vs non-nutritive sucking, benefits of skin to skin, the Second Night, benefits of breast massage and " hand expression of colostrum, inpatient lactation support and outpatient lactation resources.     Plan: Continue breastfeeding on cue with RN support as needed with a goal of 8-12 feedings per day. Encourage frequent skin to skin, breast massage and hand expression.     Due to maternal risk factors (AMA, GDM and Hypothyroid) Ciara was encouraged to hand express with each feeding for breast stimulation. She may also benefit from pumping 4-6 times per day until her milk is in. I reviewed recommendations and she will let staff know if she is interested in pumping. I will discuss with her again tomorrow when she is over 24 hours post c/s.    After discharge family plans to follow up with [facility]. They will also have breastfeeding support through [facility] and their .

## 2023-01-09 NOTE — ANESTHESIA POSTPROCEDURE EVALUATION
Patient: Dayana Whalen    Procedure: Procedure(s):   SECTION       Anesthesia Type:  Spinal    Note:  Disposition: Inpatient   Postop Pain Control: Uneventful            Sign Out: Well controlled pain   PONV: No   Neuro/Psych: Uneventful            Sign Out: Acceptable/Baseline neuro status   Airway/Respiratory: Uneventful            Sign Out: Acceptable/Baseline resp. status   CV/Hemodynamics: Uneventful            Sign Out: Acceptable CV status; No obvious hypovolemia; No obvious fluid overload   Other NRE: NONE   DID A NON-ROUTINE EVENT OCCUR? No           Last vitals:  Vitals:    23 0150 23 0215 23 0330   BP:  90/43 101/55   Pulse:  77 75   Resp:  16 17   Temp:  36.7  C (98.1  F) 36.7  C (98.1  F)   SpO2: 99%  99%       Electronically Signed By: Agusto Richardson DO  2023  5:55 AM

## 2023-01-09 NOTE — PROGRESS NOTES
"Brief Progress Note    At bedside as patient is requesting  section at this time. She is exhausted and frustrated and thinks that  section is inevitable. Still not feeling contractions and overall comfortable.     /59   Temp 98.3  F (36.8  C) (Oral)   Resp 16   Ht 1.6 m (5' 3\")   Wt 129.3 kg (285 lb)   LMP 2022   BMI 50.49 kg/m      Gen: appears comfortable    A:  Dayana Whalen is a 42 year old  at 41w6d by LMP, here for IOL for post-dates. She was transferred from [facility]. Pregnancy complicated by GDMA1. Patient meets criteria for arrest of dilation at 6cm for over 6 hours. She initially had declined  section, but now feels it is the best decision.     #Arrest of Dilation:  -Recommended  section due to arrest of dilation. Patient has been 6cm since 1130 am without change in cervical dilation.   -Has history of LSC cholecystectomy   -Consent obtained: The risks, benefits, and alternatives of  section were discussed, including the risks of bleeding, infection, injury to surrounding organs, fetal injury, and remote risks of hysterectomy. She consented to a blood transfusion in the event of a life threatening amount of bleeding. She had time to ask questions and agreed to proceed. Surgical consent was signed. Blood transfusion consent signed.   -Pain: Per anesthesia; spinal   -Anesthesia aware  -Covid negative  -Hgb 11.2 ; plt 247  -NKDA  -Ancef prior to skin incision and azithromycin intraop  -PPX: SCDs prior to surgery  -Quijano to be placed in OR  -Proceed to OR in urgent fashion     Negrita Ortiz MD  OB/GYN Resident, PGY-2      "

## 2023-01-09 NOTE — PROVIDER NOTIFICATION
01/08/23 1958   Provider Notification   Provider Name/Title Dr. Ortiz   Method of Notification Phone   Notification Reason Other (Comment)  (IUPC and FSE fell out, pt refusing monitoring while in tub)   Dr. Ortiz notified that IUPC and FSE fell out while pt standing and doing lunges. Pt expressed a major frustration with monitoring systems and inability to get into the position changes she desires to try for a vaginal delivery. Pt requesting to get into tub, she understands the inability to monitor her baby while in the tub and still desires to proceed to the tub. Dr. Ortiz notified of pt refusals.

## 2023-01-09 NOTE — ANESTHESIA PROCEDURE NOTES
"Intrathecal injection Procedure Note    Pre-Procedure   Staff -        Anesthesiologist:  Agusto Richardson DO       Resident/Fellow: Wandy Juarez MD       Performed By: resident       Location: OB       Procedure Start/Stop Times: 1/8/2023 10:08 PM and 1/8/2023 10:15 PM       Pre-Anesthestic Checklist: patient identified, IV checked, risks and benefits discussed, informed consent, monitors and equipment checked, pre-op evaluation, at physician/surgeon's request and post-op pain management  Timeout:       Correct Patient: Yes        Correct Procedure: Yes        Correct Site: Yes        Correct Position: Yes   Procedure Documentation  Procedure: intrathecal injection       Patient Position: sitting       Patient Prep/Sterile Barriers: sterile gloves, mask, patient draped       Skin prep: Chloraprep       Insertion Site: L3-4. (midline approach).       Needle Gauge: 25.        Needle Length (Inches): 5        Spinal Needle Type: Pencan       Introducer: Tuohy needle used as an interducer.       # of attempts: 2 and  # of redirects:  3    Assessment/Narrative         Paresthesias: No.       Sensory Level: T5       CSF fluid: clear.    Medication(s) Administered   0.75% Hyperbaric Bupivacaine (Intrathecal) - Intrathecal   1.6 mL - 1/8/2023 10:13:00 PM  Fentanyl PF (Intrathecal) - Intrathecal   15 mcg - 1/8/2023 10:13:00 PM  Morphine PF 1 mg/mL (Intrathecal) - Intrathecal   0.15 mg - 1/8/2023 10:13:00 PM  Medication Administration Time: 1/8/2023 10:08 PM      FOR [facility] ([facility] [place] [place]) ONLY:   Pain Team Contact information: please page the Pain Team Via [id number] Search \"Pain\". During daytime hours, please page the attending first. At night please page the resident first.    "

## 2023-01-09 NOTE — OP NOTE
[facility]  Full Operative Progress Note     Surgery Date:  2023    Surgeon:  Michelle Hughes MD    Assistants:  Candie Phillips MD PGY3, Negrita Ortiz MD, PGY-2      Pre-op Diagnosis:    Intrauterine pregnancy at 41w6d  Arrest of Dilation   GDMA1  Morbid Obesity   GBS positive   AMA    Post-op Diagnosis:    Same   Liveborn *** infant     Procedure:  Primary low-transverse  section with *** layer uterine closure via *** incision    Anesthesia: ***    EBL:  *** cc    IVF:  *** mL crystalloid    UOP:  *** mL clear*** urine at the end of the case    Drains: Quijano Catheter     Specimens:  ***    Complications: None apparent    Indications:   Dayana Whalen is a 42 year old  at 41w6d admitted for IOL for GDMA1. Patient was a transfer of care from [place] in the setting of GDMA1. Patient progressed to 6cm and after AROM and 24 hours of pitocin, patient made minimal cervical change, meeting criteria for arrest of dilation. The risks, benefits, and alternatives of  section were discussed with the patient, and she agreed to proceed.     Findings:   1. ***  2. Clear*** amniotic fluid  3. Liveborn*** infant in *** presentation. Apgars *** at 1 minute & *** at 5 minutes. Weight ***.  4. Arterial cord pH ***, base deficit ***. Venous cord pH ***, base deficit ***.  5. Normal uterus, fallopian tubes, and ovaries.     Procedure Details:   The patient was brought to the OR, where adequate *** anesthesia was administered.  She was placed in the dorsal supine position with a slight leftward tilt. She was prepped and draped in the usual sterile fashion. A surgical time out was performed. A pfannenstiel skin incision was made with the scalpel, and carried down to the underlying fascia with sharp and blunt dissection. The fascia was incised in the midline, and the incision was extended laterally with the Gordon scissors. The superior aspect of the fascia was grasped with the Kocher  clamps and dissected off of the underlying rectus muscles with blunt and sharp dissection. Attention was then turned to the inferior aspect of the fascia, which was similarly dissected off of the underlying rectus muscles. The rectus muscles were  in the midline, and the peritoneum was entered bluntly, and the opening was extended with digital pressure.*** The bladder blade was placed. The vesicouterine peritoneum was incised in the midline, and the incision was extended laterally with the Metzenbaum scissors. A bladder flap was created digitally and the bladder blade was replaced. A transverse hysterotomy was made with the scalpel in the lower uterine segment, and the incision was extended with digital pressure.*** The infant was noted to be in the ***position, and was delivered atraumatically. The shoulders delivered easily.***  *** nuchal cord was noted. The cord was doubly clamped and cut***, and the infant was handed off to the awaiting nursery*** staff. A segment of cord was cut and ***. The placenta was delivered with gentle traction on the umbilical cord and uterine massage. The uterus was exteriorized and cleared of all clots and debris. Uterine tone was noted to be *** with 20*** units of pitocin given through the running IV and uterine massage.  The hysterotomy was closed with a running locked suture of 0 Vicryl.  The hysterotomy was then imbricated using an 0 Monocryl*** suture. The hysterotomy was noted to be hemostatic. The posterior cul-de-sac was irrigated and*** cleared of all clots and debris. The uterus was returned to the abdomen. The pericolic gutters were irrigated and*** cleared of all clots and debris. The hysterotomy was reexamined and noted to be hemostatic***. The peritoneum was closed with a running 3-0 Vicryl suture.*** The fascia and rectus muscles were examined and areas of oozing were controlled with electrocautery***. *** was applied over the hysterotomy***.The fascia was  closed with a running 0 Vicryl suture. The subcutaneous tissue was irrigated and areas of oozing were controlled with electrocautery. The subcutaneous tissue was *** than 2 cm in thickness, and was therefore *** closed. The skin was closed with *** and covered with a sterile dressing.    All sponge, needle, and instrument counts were correct. The patient tolerated the procedure well, and was transferred to recovery in stable condition.  *** was present and scrubbed for the entirety of the procedure.     Negrita Ortiz MD  OB/GYN Resident, PGY-2

## 2023-01-09 NOTE — ANESTHESIA PROCEDURE NOTES
TAP Procedure Note    Pre-Procedure   Staff -        Anesthesiologist:  Agusto Richardson DO       Resident/Fellow: Wandy Juarez MD       Performed By: resident       Location: OB       Procedure Start/Stop Times: 1/8/2023 11:40 PM and 1/8/2023 11:43 PM       Pre-Anesthestic Checklist: patient identified, IV checked, site marked, risks and benefits discussed, informed consent, monitors and equipment checked, pre-op evaluation, at physician/surgeon's request and post-op pain management  Timeout:       Correct Patient: Yes        Correct Procedure: Yes        Correct Site: Yes        Correct Position: Yes        Correct Laterality: Yes        Site Marked: Yes  Procedure Documentation  Procedure: TAP       Laterality: bilateral       Patient Position: supine       Patient Prep/Sterile Barriers: sterile gloves, mask       Skin prep: Chloraprep       Insertion Site: T9-10.       Needle Type: short bevel       Needle Gauge: 21.        Needle Length (millimeters): 110        Ultrasound guided       1. Ultrasound was used to identify targeted nerve, plexus, vascular marker, or fascial plane and place a needle adjacent to it in real-time.       2. Ultrasound was used to visualize the spread of anesthetic in close proximity to the above referenced structure.       3. A permanent image is entered into the patient's record.    Assessment/Narrative         The placement was negative for: blood aspirated, painful injection and site bleeding       Paresthesias: No.       Bolus given via needle. no blood aspirated via catheter.        Secured via.        Insertion/Infusion Method: Single Shot       Complications: none    Medication(s) Administered   Bupivacaine 0.25% PF (Infiltration) - Infiltration   20 mL - 1/8/2023 11:40:00 PM  Bupivacaine liposome (Exparel) 1.3% LA inj susp (Infiltration) - Infiltration   20 mL - 1/8/2023 11:40:00 PM  Medication Administration Time: 1/8/2023 11:40 PM      FOR [facility] ([facility])  "ONLY:   Pain Team Contact information: please page the Pain Team Via [facility]. Search \"Pain\". During daytime hours, please page the attending first. At night please page the resident first.    "

## 2023-01-09 NOTE — PLAN OF CARE
Problem: Postpartum ( Delivery)  Goal: Successful Maternal Role Transition  Outcome: Progressing   Data: Vital signs within normal limits. Postpartum checks within normal limits - see flow record. Patient had nausea and emesis x 2 times last night PRN reglan given x 1, with a  nausea relief after the medication. She is drinking clear liquids and some saltines.  Patient has a slaughter in place with appropriate urine output. No apparent signs of infection. Incision is cover, dry/intact.  Patient is breastfeeding with some assistance. Patient medicated during the shift for pain. See MAR. Patient reassessed after medication,  patient stated she was comfortable.   Response: Positive attachment behaviors observed with infant. Support  present.

## 2023-01-09 NOTE — CARE PLAN
Report given to postpartum nurse and pt brought up to postpartum floor at 0200. Mother and baby in stable condition.  bands checked with oncoming nurse.

## 2023-01-09 NOTE — PROGRESS NOTES
"OB Postpartum Progress Note    S: Feeling well this morning. Pain well controlled, not having much at all. Lochia present, unsure how much. Drank too much water too quickly and had vomiting x2, but now tolerating smaller amount of PO (crackers, sips of water) without nausea or emesis. Not passing flatus or BM. Not yet ambulating. Quijano still in place. Breastfeeding.  No headache, vision changes, CP, SOB, RUQ pain.    O:  /64 (BP Location: Right arm, Patient Position: Semi-Kurtz's, Cuff Size: Adult Regular)   Pulse 78   Temp 98.2  F (36.8  C) (Oral)   Resp 18   Ht 1.6 m (5' 3\")   Wt 129.3 kg (285 lb)   LMP 2022   SpO2 99%   Breastfeeding Unknown   BMI 50.49 kg/m       Gen: NAD. Resting comfortably in bed.  CV: Regular rate  Resp: On room air, no increased work of breathing  Abd: Soft, appropriately tender, fundus firm below umbilicus  Incision: C/D/I, bandage and abd binder in place  Ext: Trace lower extremity edema bilaterally    UOP:   Intake/Output Summary (Last 24 hours) at 2023 0657  Last data filed at 2023 0600  Gross per 24 hour   Intake 1150 ml   Output 1738 ml   Net -588 ml     Labs:   Hemoglobin   Date Value Ref Range Status   2023 11.2 (L) 11.7 - 15.7 g/dL Final       A/P: Dayana Whalen is a 42 year old  who is POD#1 s/p PLTCS for arrest of dilation. Pregnancy notable for GDMA1, obesity, hypothyroidism. Doing well postpartum. VSS and UOP adequate. Stable in early post-op period.    # GDMA1  - Fasting BG pending  - Repeat 2h GTT at 6w visit    # Hypothyroid  - Continue PTA synthroid    # Postpartum/Postop  - Pain: Continue scheduled tylenol, ibuprofen and prn oxycodone  - Heme: Higher than average blood loss during surgery. No s/s of ongoing blood loss. Hgb pending. Continue to monitor vitals. Repeat hgb as clinically indicated. Asymptomatic at this time.  - GI: Scheduled senna, simethicone. Prn miralax, suppository, anti-emetics  - : sukhjinder out this AM, " "follow up void   - PNC: Rh pos, Rubella imm - no interventions  - Breast feeding; no issues  - Contraception: did not discuss. Need to discuss recommended 18 month spacing prior to next pregnancy.  - PPx: Encourage ambulation, IS, SCDs while confined to bed. Lovenox ppx BID while inpatient    Anticipate discharge POD#2-3; once meeting postpartum discharge goals     Candie Phillips MD  ObGyn, PGY-3  01/09/2023 6:55 AM     /64 (BP Location: Right arm, Patient Position: Semi-Kurtz's, Cuff Size: Adult Regular)   Pulse 78   Temp 98.2  F (36.8  C) (Oral)   Resp 18   Ht 1.6 m (5' 3\")   Wt 129.3 kg (285 lb)   LMP 03/17/2022   SpO2 99%   Breastfeeding Unknown   BMI 50.49 kg/m    Hemoglobin   Date Value Ref Range Status   01/07/2023 11.2 (L) 11.7 - 15.7 g/dL Final     Am hgb pending still    The patient was seen and examined by me separately from the team.  I have reviewed and agree with the above note.  She is tired but ok this morning.  Nausea has resolved, tolerated some saltines.  Pain is controlled, no other concerns at this time.  Continue routine post op care.     Lennie Reyes MD, FACOG    "

## 2023-01-09 NOTE — PLAN OF CARE
Delivery Note  Primary C/S of viable Male with Dr. Hughes, Dr. Ortiz and Dr. Phillips in attendance.  NICU/Nursery RN Jean MOJIAC present.  Infant with spontaneous cry, to mother's abdomen, dried and stimulated.  APGAR at 1 minute:  9 and APGAR at 5 minutes:  9.  Placenta delivered with out complication, TXA, pitocin, Methergine administered by Anesthesia. Uterine incision closed.  Mother and baby in stable condition.

## 2023-01-09 NOTE — ANESTHESIA CARE TRANSFER NOTE
Patient: Dayana Whalen    Procedure: Procedure(s):   SECTION       Diagnosis: 40 weeks gestation of pregnancy [Z3A.40]  Diagnosis Additional Information: No value filed.    Anesthesia Type:   Spinal     Note:    Oropharynx: spontaneously breathing  Level of Consciousness: awake  Oxygen Supplementation: room air    Independent Airway: airway patency satisfactory and stable    Vital Signs Stable: post-procedure vital signs reviewed and stable  Report to RN Given: handoff report given  Patient transferred to: PACU    Handoff Report: Identifed the Patient, Identified the Reponsible Provider, Reviewed the pertinent medical history, Discussed the surgical course, Reviewed Intra-OP anesthesia mangement and issues during anesthesia, Set expectations for post-procedure period and Allowed opportunity for questions and acknowledgement of understanding      Vitals:  Vitals Value Taken Time   /64    Temp     Pulse 76    Resp 16    SpO2 100%        Electronically Signed By: Wandy Juarez MD  2023  11:57 PM

## 2023-01-09 NOTE — DISCHARGE SUMMARY
[facility] Discharge Summary    Dayana Whalen MRN# [id number]   Age: 42 year old YOB: 1980     Date of Admission:  2023  Date of Discharge::  01/10/23   Admitting Physician:  Lois Pfeiffer MD  Discharge Physician:  Lois Pfeiffer MD             Admission Diagnoses:   -   - IUP at 41w5d  - Prolonged latent labor  - Morbid obesity  - GDMA1  - Hypothyroidism          Discharge Diagnosis:   -   - Postpartum s/p PLTCS  - Arrest of dilation  - PPH  - Acute blood loss anemia, asymptomatic on oral iron            Procedures:     Procedure(s): Primary low transverse  section with double layer closure via Pfannenstiel skin incision  - Spinal anesthesia  - TAP block, bilateral                Medications Prior to Admission:     Medications Prior to Admission   Medication Sig Dispense Refill Last Dose     levothyroxine (SYNTHROID/LEVOTHROID) 125 MCG tablet Take 125 mcg by mouth daily   2023             Discharge Medications:        Review of your medicines      START taking      Dose / Directions   acetaminophen 325 MG tablet  Commonly known as: TYLENOL  Used for: S/P  section      Dose: 650-975 mg  Take 2-3 tablets (650-975 mg) by mouth every 6 hours as needed for mild pain Start after Delivery.  Quantity: 100 tablet  Refills: 0     ferrous sulfate 325 (65 Fe) MG tablet  Commonly known as: FEROSUL  Used for: ABLA (acute blood loss anemia)      Dose: 325 mg  Take 1 tablet (325 mg) by mouth daily (with breakfast)  Quantity: 90 tablet  Refills: 0     ibuprofen 200 MG tablet  Commonly known as: ADVIL/MOTRIN  Used for: S/P  section      Dose: 600 mg  Take 3 tablets (600 mg) by mouth every 6 hours as needed for moderate pain (4-6) Start after delivery  Refills: 0     senna-docusate 8.6-50 MG tablet  Commonly known as: SENOKOT-S/PERICOLACE  Used for: S/P  section      Dose: 1 tablet  Take 1 tablet by mouth daily Start after delivery.  Quantity: 100  tablet  Refills: 0        CONTINUE these medicines which have NOT CHANGED      Dose / Directions   levothyroxine 125 MCG tablet  Commonly known as: SYNTHROID/LEVOTHROID      Dose: 125 mcg  Take 125 mcg by mouth daily  Refills: 0           Where to get your medicines      Some of these will need a paper prescription and others can be bought over the counter. Ask your nurse if you have questions.    You don't need a prescription for these medications    acetaminophen 325 MG tablet    ferrous sulfate 325 (65 Fe) MG tablet    ibuprofen 200 MG tablet    senna-docusate 8.6-50 MG tablet               Consultations:   Anesthesiology  NICU          Brief Admission History:   Dayana Whalen is a 42 year old now  who presented at 41w6d after transfer of care from [facility] for prolonged labor and IOL for GDMA1. She was 5cm at the time of transfer and did not progress beyond 6cm despite AROM and augmentation with pitocin. A  section was recommended. Risks and benefits were reviewed and the patient consented for the procedure.       Intraoperative course   The procedure was notable for PPH.  EBL 1200 mL.  See operative report for details.     Intraoperative findings:  - Viable male infant delivered at 2241 on 2023 with APGARs 9 and 9. Weight 8lb.   - Cord gases: not sent.   - Clear amniotic fluid, Placenta intact.   - Normal uterus, tubes, and ovaries.   - No adhesions.   - Surgical sites noted to be hemostatic at the end of case.        Postpartum Course   The patient's hospital course was unremarkable.  She recovered as anticipated and experienced no post-operative complications.  On discharge, her pain was well controlled. Vaginal bleeding was appropriate for postpartum period.  Voiding without difficulty.  Ambulating well and tolerating a normal diet without nausea or emesis.  No fever or significant wound drainage.  Breastfeeding well.  Infant is stable.  Passing flatus.  Rubella vaccine prior  to discharge.    She was discharged on post-partum day #2.    She will follow up with primary OB for contraception.    O POS Rhogam not indicated.     Post-partum hemoglobin:   Hemoglobin   Date Value Ref Range Status   01/09/2023 8.5 (L) 11.7 - 15.7 g/dL Final             Discharge Instructions and Follow-Up:     Discharge diet: Regular   Discharge activity: No lifting greater than 20 lbs, pushing, pulling, or other strenuous activity for 6 weeks. Pelvic rest for 6 weeks including no sexual intercourse, tampons, or douching. No driving until you can slam on the brakes without pain or while on narcotic pain medications.    Discharge follow-up: Follow up with primary OB for routine postpartum visit in 6 weeks and repeat 2 hour GTT for gestational diabetes   Wound care: Keep incision clean and dry           Discharge Disposition:     Discharged to home     Candie Phillips MD  ObGyn, PGY-3  01/10/2023 6:52 AM    I saw and evaluated this patient prior to discharge. I discussed the patient with the resident and agree with plan of care as documented in the resident note.   I personally reviewed vital signs, medications, labs.   I personally spent 10 minutes on discharge activities.  Lois Pfeiffer MD

## 2023-01-09 NOTE — PROGRESS NOTES
CTSP as she had made the decision to proceed with primary  section.    Consent reviewed, signed with Dr. Ortiz.    Questions answered, will proceed within 30 minutes.    Michelle Hughes MD, MPH

## 2023-01-09 NOTE — ANESTHESIA PREPROCEDURE EVALUATION
Anesthesia Pre-Procedure Evaluation    Patient: Dayana Whalen   MRN: [id number] : 1980        Procedure :           No past medical history on file.   Past Surgical History:   Procedure Laterality Date     AS REMOVAL OF TONSILS,12+ Y/O       CHOLECYSTECTOMY        Allergies   Allergen Reactions     Sulfa Drugs Hives      Social History     Tobacco Use     Smoking status: Not on file     Smokeless tobacco: Not on file   Substance Use Topics     Alcohol use: Not on file      Wt Readings from Last 1 Encounters:   23 129.3 kg (285 lb)        Anesthesia Evaluation   Pt has had prior anesthetic. Type: General.    History of anesthetic complications  - PONV.      ROS/MED HX  ENT/Pulmonary:       Neurologic:       Cardiovascular:       METS/Exercise Tolerance:     Hematologic:     (+) no thrombocytopenia, anemia,     Musculoskeletal:       GI/Hepatic:       Renal/Genitourinary:       Endo:     (+) thyroid problem, hypothyroidism, Obesity, gestational diabetes,    Psychiatric/Substance Use:       Infectious Disease:       Malignancy:       Other:     (-) previous        Physical Exam    Airway        Mallampati: II   TM distance: > 3 FB   Neck ROM: full   Mouth opening: > 3 cm    Respiratory Devices and Support         Dental  no notable dental history         Cardiovascular   cardiovascular exam normal          Pulmonary   pulmonary exam normal                OUTSIDE LABS:  CBC:   Lab Results   Component Value Date    WBC 11.2 (H) 2023    HGB 11.2 (L) 2023    HCT 34.2 (L) 2023     2023     BMP:   Lab Results   Component Value Date    GLC 93 2023    GLC 80 2023     COAGS: No results found for: PTT, INR, FIBR  POC: No results found for: BGM, HCG, HCGS  HEPATIC: No results found for: ALBUMIN, PROTTOTAL, ALT, AST, GGT, ALKPHOS, BILITOTAL, BILIDIRECT, LENORA  OTHER: No results found for: PH, LACT, A1C, VASILIY, PHOS, MAG, LIPASE, AMYLASE, TSH, T4, T3, CRP,  SED    Anesthesia Plan    ASA Status:  2      Anesthesia Type: Spinal.              Consents    Anesthesia Plan(s) and associated risks, benefits, and realistic alternatives discussed. Questions answered and patient/representative(s) expressed understanding.    - Discussed:     - Discussed with:  Patient         Postoperative Care            Comments:                Wandy Juarez MD

## 2023-01-09 NOTE — CARE PLAN
OR to PACU Transfer Note  Data: Pt to OB PACU at 2345 via cart. PIV infusing NS and oxytocin without complications, slaughter with clear urine to gravity, temp 98.1, vs WDL, pt does not complain of pain and/or nausea.   Interventions: IV to pump, monitors and alarms on, SCD on.  Response: stable and denying pain at this time. Uterus remains firm and midline with scant lochia. IV nubain given for itching. (See MAR)  Plan: Patient instructed to notify RN for pain or nausea, routine post op cares, initiate skin to skin and breastfeeding/pumping as soon as patient/infant able.

## 2023-01-10 VITALS
TEMPERATURE: 98.1 F | SYSTOLIC BLOOD PRESSURE: 102 MMHG | HEART RATE: 94 BPM | RESPIRATION RATE: 16 BRPM | HEIGHT: 63 IN | BODY MASS INDEX: 50.5 KG/M2 | WEIGHT: 285 LBS | DIASTOLIC BLOOD PRESSURE: 69 MMHG | OXYGEN SATURATION: 99 %

## 2023-01-10 LAB — GLUCOSE BLDC GLUCOMTR-MCNC: 98 MG/DL (ref 70–99)

## 2023-01-10 PROCEDURE — 90471 IMMUNIZATION ADMIN: CPT | Performed by: OBSTETRICS & GYNECOLOGY

## 2023-01-10 PROCEDURE — 90707 MMR VACCINE SC: CPT | Performed by: OBSTETRICS & GYNECOLOGY

## 2023-01-10 PROCEDURE — 250N000013 HC RX MED GY IP 250 OP 250 PS 637

## 2023-01-10 PROCEDURE — 250N000013 HC RX MED GY IP 250 OP 250 PS 637: Performed by: STUDENT IN AN ORGANIZED HEALTH CARE EDUCATION/TRAINING PROGRAM

## 2023-01-10 PROCEDURE — 250N000011 HC RX IP 250 OP 636: Performed by: STUDENT IN AN ORGANIZED HEALTH CARE EDUCATION/TRAINING PROGRAM

## 2023-01-10 PROCEDURE — 250N000011 HC RX IP 250 OP 636: Performed by: OBSTETRICS & GYNECOLOGY

## 2023-01-10 RX ORDER — ACETAMINOPHEN 325 MG/1
650-975 TABLET ORAL EVERY 6 HOURS PRN
Qty: 100 TABLET | Refills: 0 | COMMUNITY
Start: 2023-01-10

## 2023-01-10 RX ORDER — FERROUS SULFATE 325(65) MG
325 TABLET ORAL
Qty: 90 TABLET | Refills: 0 | Status: SHIPPED | OUTPATIENT
Start: 2023-01-10 | End: 2023-01-10

## 2023-01-10 RX ORDER — IBUPROFEN 600 MG/1
600 TABLET, FILM COATED ORAL EVERY 6 HOURS PRN
Qty: 60 TABLET | Refills: 0 | Status: SHIPPED | OUTPATIENT
Start: 2023-01-10 | End: 2023-01-10

## 2023-01-10 RX ORDER — IBUPROFEN 200 MG
600 TABLET ORAL EVERY 6 HOURS PRN
COMMUNITY
Start: 2023-01-10

## 2023-01-10 RX ORDER — AMOXICILLIN 250 MG
1 CAPSULE ORAL DAILY
Qty: 100 TABLET | Refills: 0 | Status: SHIPPED | OUTPATIENT
Start: 2023-01-10 | End: 2023-01-10

## 2023-01-10 RX ORDER — ACETAMINOPHEN 325 MG/1
650 TABLET ORAL EVERY 6 HOURS PRN
Qty: 100 TABLET | Refills: 0 | Status: SHIPPED | OUTPATIENT
Start: 2023-01-10 | End: 2023-01-10

## 2023-01-10 RX ORDER — AMOXICILLIN 250 MG
1 CAPSULE ORAL DAILY
Qty: 100 TABLET | Refills: 0 | COMMUNITY
Start: 2023-01-10

## 2023-01-10 RX ORDER — FERROUS SULFATE 325(65) MG
325 TABLET ORAL
Qty: 90 TABLET | Refills: 0 | COMMUNITY
Start: 2023-01-10

## 2023-01-10 RX ADMIN — ACETAMINOPHEN 975 MG: 325 TABLET, FILM COATED ORAL at 01:19

## 2023-01-10 RX ADMIN — IBUPROFEN 800 MG: 800 TABLET, FILM COATED ORAL at 06:37

## 2023-01-10 RX ADMIN — ENOXAPARIN SODIUM 40 MG: 40 INJECTION SUBCUTANEOUS at 10:18

## 2023-01-10 RX ADMIN — MEASLES, MUMPS, AND RUBELLA VIRUS VACCINE LIVE 0.5 ML: 1000; 12500; 1000 INJECTION, POWDER, LYOPHILIZED, FOR SUSPENSION SUBCUTANEOUS at 10:12

## 2023-01-10 RX ADMIN — ACETAMINOPHEN 975 MG: 325 TABLET, FILM COATED ORAL at 07:35

## 2023-01-10 RX ADMIN — LEVOTHYROXINE SODIUM 112 MCG: 0.11 TABLET ORAL at 10:18

## 2023-01-10 RX ADMIN — SENNOSIDES AND DOCUSATE SODIUM 2 TABLET: 8.6; 5 TABLET ORAL at 07:36

## 2023-01-10 ASSESSMENT — ACTIVITIES OF DAILY LIVING (ADL)
ADLS_ACUITY_SCORE: 20

## 2023-01-10 NOTE — PROGRESS NOTES
"OB Postpartum Progress Note    S: Feeling well this morning. Pain well controlled. Lochia improving. Tolerating PO without nausea or emesis. Passing flatus. Ambulating without dizziness. Voiding spontaneously. Breastfeeding.  No headache, vision changes, CP, SOB, RUQ pain. Desires discharge today.    O:  BP 96/59 (BP Location: Left arm, Patient Position: Semi-Kurtz's, Cuff Size: Adult Regular)   Pulse 84   Temp 97.5  F (36.4  C) (Oral)   Resp 18   Ht 1.6 m (5' 3\")   Wt 129.3 kg (285 lb)   LMP 2022   SpO2 99%   Breastfeeding Yes   BMI 50.49 kg/m       Gen: NAD. Resting comfortably in bed.  CV: Regular rate  Resp: On room air, no increased work of breathing  Abd: Soft, appropriately tender, fundus firm below umbilicus  Incision: C/D/I  Ext: Trace lower extremity edema bilaterally    Labs:   Hemoglobin   Date Value Ref Range Status   2023 8.5 (L) 11.7 - 15.7 g/dL Final   2023 11.2 (L) 11.7 - 15.7 g/dL Final     Fasting BG 98    A/P: Dayana Whalen is a 42 year old  who is POD#2 s/p PLTCS for arrest of dilation. Pregnancy notable for GDMA1, obesity, hypothyroidism. Doing well postpartum, meeting all post-op goals for discharge    # GDMA1  - Fasting BG wnl  - Repeat 2h GTT at 6w visit    # Hypothyroid  - Continue PTA synthroid    # Postpartum/Postop  - Pain: Continue scheduled tylenol, ibuprofen and prn oxycodone  - Heme: Higher than average blood loss during surgery. No s/s of ongoing blood loss. Hgb demonstrating ABLA as expected. Asymptomatic at this time. Discharge with PO iron  - GI: Scheduled senna, simethicone. Prn miralax, suppository, anti-emetics  - : s/p slaughter, voiding  - PNC: Rh pos, Rubella imm - no interventions  - Breast feeding; no issues  - Contraception: follow up at 6w PP visit  - PPx: Encourage ambulation, IS, SCDs while confined to bed. Lovenox ppx BID while inpatient    Anticipate discharge home later today    Candie Phillips MD  ObGyn, PGY-3  01/10/2023 6:49 " AM     Appreciate Dr. Phillips's note above, patient also seen and examined by me.  Patient is not using oxycodone and declines any on discharge.  I agree with the note above.   Lois Pfeiffer MD

## 2023-01-10 NOTE — LACTATION NOTE
This note was copied from a baby's chart.  Brief Lactation Consult    Met briefly with Ciara again as she was preparing to discharge.    She shares that breastfeeding is going really well and she has been working with her nurse today on the football hold.    Ciara will have breastfeeding support from her [facility] at home.           Delisa Keene RN, IBCLC  Lactation Consultant  Ascom: [phone]  Office: [phone]

## 2023-01-10 NOTE — PLAN OF CARE
Data: Vital signs stable, assessments within normal limits.   Patient instructed of signs/symptoms to look for and report per discharge instructions.   Discharge outcomes on care plan met.   Pain well controlled with oral medication.    Action: Review of care plan, teaching, and discharge instructions done with patient. AVS printed and patient given a copy. Patient verification with signature obtained. Discharge medication instructions for use covered, states she will pick them up at her home phamacy OTC.    Response: Patient verbalized understanding of all education provided. Patient stated comfort with home cares and follow-up. All questions addressed.     Patient discharged 1130.

## 2023-01-10 NOTE — PLAN OF CARE
Goal Outcome Evaluation:      Plan of Care Reviewed With: patient, spouse    Overall Patient Progress: improvingOverall Patient Progress: improving    VSS. Fundus midline, firm, and at umbilicus. Lochia scant. Pain adequately managed with tylenol/ibuprofen. Incision covered with surgical dressing. Voiding without difficulty. Breastfeeding well with good latch. Bonding well with . Continue with plan of care.

## 2023-01-11 ENCOUNTER — TELEPHONE (OUTPATIENT)
Dept: OBGYN | Facility: CLINIC | Age: 43
End: 2023-01-11

## 2023-01-11 DIAGNOSIS — G89.18 ACUTE POST-OPERATIVE PAIN: Primary | ICD-10-CM

## 2023-01-11 DIAGNOSIS — R11.0 NAUSEA: ICD-10-CM

## 2023-01-11 RX ORDER — ONDANSETRON 4 MG/1
4 TABLET, FILM COATED ORAL EVERY 8 HOURS PRN
Qty: 20 TABLET | Refills: 0 | Status: SHIPPED | OUTPATIENT
Start: 2023-01-11 | End: 2023-01-27

## 2023-01-11 RX ORDER — OXYCODONE HYDROCHLORIDE 5 MG/1
5 TABLET ORAL EVERY 6 HOURS PRN
Qty: 6 TABLET | Refills: 0 | Status: SHIPPED | OUTPATIENT
Start: 2023-01-11 | End: 2023-01-14

## 2023-01-12 NOTE — TELEPHONE ENCOUNTER
Ciara called 3 days s/p CS with worsening incisional pain. Was using toradol in hospital, now more active and using tylenol/ibrupfon. Has incision pain, denies any leaking from incision. Rx oxycodone called into pharmacy with zofran for nausea. WIll call clinic tomorrow if symptoms not improved with pain medicine. All questions answered.     Leida Wallace MD

## 2023-01-24 NOTE — PROGRESS NOTES
[facility]    HPI: Dayana Whalen is a 42 year old  who presents to clinic today with her partner and child for an incision check. She had a primary LTCS on 23 for arrest of dilation. Pregnancy notable for late-term pregnancy, GDMA1, AMA, class 3 obesity, hypothyroidism. She was a transfer of care from [facility]. Not needing pain medications anymore. No concerns about her incision. Breastfeeding exclusively, every 2-3 hrs. Pumping 2-3x/day. Baby is growing well. Has seen lactation consulant last week and felt it was helfpul for some latch issues. Bleeding is light, a little bit more when feeding and dark in color. No other concerns. Mood has been off and on, feels like she will have a teary day and then the next day be ok. History of DONTE, seeing a therapist and in a mom's support group. Feels supported with these resources.     Has been checking BG occasionally with FBG around 101. Knows she is eating some more carbs and will need to adjust her diet. 3 hr PP once was 134. Would prefer to manage with diet while breastfeeding over using oral medications.     Also has some concerns about diagnoses listed in her chart, particuluarly her BMI and high risk pregnancy.     OB History:       PMH, PSH, Family history, Social history, medications and allergies were reviewed and updated in EMR.     Objective:   /75   Pulse 88   Wt 108.9 kg (240 lb)   LMP 2022   BMI 42.51 kg/m    General: Healthy appearing. Alert, oriented. Affect is appropriate.   Abdomen: Soft, nontender, fundus below umbilicus.    Incision: Healing well with no surrounding erythema or drainage. Firm area on right aspect of incision consistent with tissue remodeling, no fluctuance concerning for seroma or abscess.     Assessment/Plan:   Dayana Whalen is a 42 year old  female here for post-operative check s/p primary CS.     GDMA1  - recommend 2 hr GTT at 6 weeks or later  postpartum  - knows diet changes to work on. Discussed high risk for progression to prediabetes/diabetes.     Hypothyroidism  - taking synthroid 112mcg daily, currently [facility] is managing and has not changed dose yet postpartum. Planning to establish care with PCP.     Postpartum care  - 6 week postpartum appointment scheduled   - Contraception: planning vasectomy, knows to avoid unprotected intercourse for 3 months afterward  - Breastfeeding is going well     Maria T Faust MD

## 2023-01-27 ENCOUNTER — OFFICE VISIT (OUTPATIENT)
Dept: OBGYN | Facility: CLINIC | Age: 43
End: 2023-01-27
Attending: STUDENT IN AN ORGANIZED HEALTH CARE EDUCATION/TRAINING PROGRAM
Payer: COMMERCIAL

## 2023-01-27 VITALS
WEIGHT: 240 LBS | DIASTOLIC BLOOD PRESSURE: 75 MMHG | HEART RATE: 88 BPM | BODY MASS INDEX: 42.51 KG/M2 | SYSTOLIC BLOOD PRESSURE: 110 MMHG

## 2023-01-27 DIAGNOSIS — E03.9 ACQUIRED HYPOTHYROIDISM: ICD-10-CM

## 2023-01-27 DIAGNOSIS — Z98.891 HISTORY OF CESAREAN SECTION: ICD-10-CM

## 2023-01-27 DIAGNOSIS — Z86.32 HISTORY OF GESTATIONAL DIABETES: ICD-10-CM

## 2023-01-27 PROBLEM — O09.93 HIGH-RISK PREGNANCY SUPERVISION, THIRD TRIMESTER: Status: RESOLVED | Noted: 2022-05-02 | Resolved: 2023-01-27

## 2023-01-27 PROBLEM — O24.410 DIET CONTROLLED GESTATIONAL DIABETES MELLITUS (GDM) IN THIRD TRIMESTER: Status: RESOLVED | Noted: 2023-01-07 | Resolved: 2023-01-27

## 2023-01-27 PROBLEM — E66.01 MORBID OBESITY (H): Status: RESOLVED | Noted: 2023-01-07 | Resolved: 2023-01-27

## 2023-01-27 PROBLEM — Z34.90 ENCOUNTER FOR INDUCTION OF LABOR: Status: RESOLVED | Noted: 2023-01-07 | Resolved: 2023-01-27

## 2023-01-27 PROCEDURE — 99207 PR POST PARTUM EXAM: CPT | Performed by: STUDENT IN AN ORGANIZED HEALTH CARE EDUCATION/TRAINING PROGRAM

## 2023-01-27 PROCEDURE — G0463 HOSPITAL OUTPT CLINIC VISIT: HCPCS | Performed by: STUDENT IN AN ORGANIZED HEALTH CARE EDUCATION/TRAINING PROGRAM

## 2023-01-27 NOTE — PATIENT INSTRUCTIONS
Thank you for trusting us with your care!     If you need to contact us for questions about:  Symptoms, Scheduling & Medical Questions; Non-urgent (2-3 day response) Kareem message, Urgent (needing response today) [phone] (if after 3:30pm next day response)   Prescriptions: Please call your Pharmacy   Billing: Nadeen [phone] or [facility]:[phone]

## 2023-01-27 NOTE — LETTER
2023       RE: Dayana Whalen  [address] [address]     Dear Colleague,    Thank you for referring your patient, Dayana Whalen, to the [facility]. Please see a copy of my visit note below.    [facility]    HPI: Dayana Whalen is a 42 year old  who presents to clinic today with her partner and child for an incision check. She had a primary LTCS on 23 for arrest of dilation. Pregnancy notable for late-term pregnancy, GDMA1, AMA, class 3 obesity, hypothyroidism. She was a transfer of care from [facility]. Not needing pain medications anymore. No concerns about her incision. Breastfeeding exclusively, every 2-3 hrs. Pumping 2-3x/day. Baby is growing well. Has seen lactation consulant last week and felt it was helfpul for some latch issues. Bleeding is light, a little bit more when feeding and dark in color. No other concerns. Mood has been off and on, feels like she will have a teary day and then the next day be ok. History of DONTE, seeing a therapist and in a mom's support group. Feels supported with these resources.     Has been checking BG occasionally with FBG around 101. Knows she is eating some more carbs and will need to adjust her diet. 3 hr PP once was 134. Would prefer to manage with diet while breastfeeding over using oral medications.     Also has some concerns about diagnoses listed in her chart, particuluarly her BMI and high risk pregnancy.     OB History:       PMH, PSH, Family history, Social history, medications and allergies were reviewed and updated in EMR.     Objective:   /75   Pulse 88   Wt 108.9 kg (240 lb)   LMP 2022   BMI 42.51 kg/m    General: Healthy appearing. Alert, oriented. Affect is appropriate.   Abdomen: Soft, nontender, fundus below umbilicus.    Incision: Healing well with no surrounding  erythema or drainage. Firm area on right aspect of incision consistent with tissue remodeling, no fluctuance concerning for seroma or abscess.     Assessment/Plan:   Dayana Whalen is a 42 year old  female here for post-operative check s/p primary CS.     GDMA1  - recommend 2 hr GTT at 6 weeks or later postpartum  - knows diet changes to work on. Discussed high risk for progression to prediabetes/diabetes.     Hypothyroidism  - taking synthroid 112mcg daily, currently [facility] is managing and has not changed dose yet postpartum. Planning to establish care with PCP.     Postpartum care  - 6 week postpartum appointment scheduled   - Contraception: planning vasectomy, knows to avoid unprotected intercourse for 3 months afterward  - Breastfeeding is going well     Maria T Faust MD

## 2023-02-15 ENCOUNTER — ALLIED HEALTH/NURSE VISIT (OUTPATIENT)
Dept: OBGYN | Facility: CLINIC | Age: 43
End: 2023-02-15
Attending: OBSTETRICS & GYNECOLOGY
Payer: COMMERCIAL

## 2023-02-15 ENCOUNTER — TELEPHONE (OUTPATIENT)
Dept: OBGYN | Facility: CLINIC | Age: 43
End: 2023-02-15
Payer: COMMERCIAL

## 2023-02-15 VITALS — TEMPERATURE: 97.7 F | DIASTOLIC BLOOD PRESSURE: 83 MMHG | HEART RATE: 87 BPM | SYSTOLIC BLOOD PRESSURE: 118 MMHG

## 2023-02-15 PROCEDURE — G0463 HOSPITAL OUTPT CLINIC VISIT: HCPCS | Performed by: OBSTETRICS & GYNECOLOGY

## 2023-02-15 PROCEDURE — 99207 PR POST PARTUM EXAM: CPT | Performed by: OBSTETRICS & GYNECOLOGY

## 2023-02-15 NOTE — TELEPHONE ENCOUNTER
Ciara is calling today because her incision started bleeding and is not stopping.    She reports that the area of concern is less than 1/2 inch.  She states it is not red, hot to the touch or tender.    She is agreeable to a nurse visit to assess the site this afternoon.

## 2023-02-15 NOTE — PROGRESS NOTES
[facility]    HPI: Ciara Whalen is a 42 year old  who presents for a telephone postpartum visit. She had a primary LTCS on 23 for arrest of dilation. Pregnancy notable for late-term pregnancy, GDMA1, AMA, class 3 obesity, hypothyroidism. She was a transfer of care from [facility].    She was in last week for incision concerns. States it looks much better now, and stopped bleeding a few hours after. No fevers or chills and no incisional drainage or opening. Lochia stopped about 2 weeks ago. Breastfeeding is going well, able to save some with pumping. No supplementing, breastfeeding every 2-3 hours. Mood changes still occur in waves with a few days feeling good and then a couple days feeling weepy and more stressed. History of DONTE, seeing a therapist and in a mom's support group. Feels supported with these resources.     Has not checked BG regularly. Still taking 112mcg of synthroid, has not had TSH checked since delivery. Her PCP has left the practice so she will be looking for a new one to prescribe synthroid. Taking PNV and supplementing with liquid iron. Did give blood regularly before pregnancy and wondering when she can start again. Lives closer to [facility] and would like labs drawn there.     Pap: NILM 2020  Hgb at discharge: 8.5    OB History:       PMH, PSH, Family history, Social history, medications and allergies were reviewed and updated in EMR.     Assessment/Plan:   Dayana Whalen is a 42 year old  female here for a telephone postpartum visit following a LTCS.    GDMA1  - recommend 2 hr GTT at 6 weeks or later postpartum, ordered  - knows diet changes to work on. Discussed high risk for progression to prediabetes/diabetes.     Hypothyroidism  - taking synthroid 112mcg daily, same dose as prepregnancy  - Planning to establish care with PCP  - TSH ordered     Postpartum care  - doing well overall. Feels supported with current mental health  resources.   - Contraception: planning vasectomy, knows to avoid unprotected intercourse for 3 months afterward  - Breastfeeding is going well   - next pap in 12/2023  - CBC ordered d/t acute blood loss anemia and current continued iron supplementation  - discussed reaching out if any other concerns in the next year, otherwise return to regular preventative care    Maria T Faust MD

## 2023-02-15 NOTE — PROGRESS NOTES
Asked to see this kind 5 week PP patient for wound check.     Past Surgical History:   Procedure Laterality Date     AS REMOVAL OF TONSILS,12+ Y/O        SECTION N/A 2023    Procedure:  SECTION;  Surgeon: Michelle Hughes MD;  Location: [facility] L[facility]     CHOLECYSTECTOMY       O:  /83 (BP Location: Left arm, Patient Position: Sitting, Cuff Size: Adult Large)   Pulse 87   Temp 97.7  F (36.5  C) (Oral)   LMP 2022   Abdomen: soft NT pannus is without edema or erythema  Incision well healing with fibrosis right lateral edge and mid incision. The mid incision area has also developed a 1x1mm area of separation with a small amount of serosanguinous drainage ... immediately inferior to this area the skin is erythematous and mildly excoriated.     A/P:   Minor wound separation  Bacitracin to excoriated area  4x4 gauze to absorb any further transudate    Marlena Beckett MD

## 2023-02-15 NOTE — NURSING NOTE
Pt presented to clinic for incision check. C/S on 1/8. VS WNL. Patient denies feeling feverish. Patient reports that she noticed some blood on her underwear, and when she pressed on the incision noticed bloody drainage which continued to leak throughout the day. Pink area of skin breakdown noted below incision as well as small area on incision with serosanguinous drainage. Incision assessed by Dr. Beckett, see Dr. Beckett note.

## 2023-02-22 ENCOUNTER — VIRTUAL VISIT (OUTPATIENT)
Dept: OBGYN | Facility: CLINIC | Age: 43
End: 2023-02-22
Attending: STUDENT IN AN ORGANIZED HEALTH CARE EDUCATION/TRAINING PROGRAM
Payer: COMMERCIAL

## 2023-02-22 DIAGNOSIS — Z86.32 HISTORY OF GESTATIONAL DIABETES: ICD-10-CM

## 2023-02-22 DIAGNOSIS — D62 ANEMIA DUE TO BLOOD LOSS, ACUTE: ICD-10-CM

## 2023-02-22 DIAGNOSIS — Z98.891 HISTORY OF CESAREAN SECTION: ICD-10-CM

## 2023-02-22 DIAGNOSIS — E03.9 ACQUIRED HYPOTHYROIDISM: ICD-10-CM

## 2023-02-22 NOTE — LETTER
2023       RE: Dayana Whalen  [address] [address]     Dear Colleague,    Thank you for referring your patient, Dayana Whalen, to the [facility]. Please see a copy of my visit note below.    [facility]    HPI: Ciara Whalen is a 42 year old  who presents for a telephone postpartum visit. She had a primary LTCS on 23 for arrest of dilation. Pregnancy notable for late-term pregnancy, GDMA1, AMA, class 3 obesity, hypothyroidism. She was a transfer of care from [facility].    She was in last week for incision concerns. States it looks much better now, and stopped bleeding a few hours after. No fevers or chills and no incisional drainage or opening. Lochia stopped about 2 weeks ago. Breastfeeding is going well, able to save some with pumping. No supplementing, breastfeeding every 2-3 hours. Mood changes still occur in waves with a few days feeling good and then a couple days feeling weepy and more stressed. History of DONTE, seeing a therapist and in a mom's support group. Feels supported with these resources.     Has not checked BG regularly. Still taking 112mcg of synthroid, has not had TSH checked since delivery. Her PCP has left the practice so she will be looking for a new one to prescribe synthroid. Taking PNV and supplementing with liquid iron. Did give blood regularly before pregnancy and wondering when she can start again. Lives closer to [facility] and would like labs drawn there.     Pap: NILM 2020  Hgb at discharge: 8.5    OB History:       PMH, PSH, Family history, Social history, medications and allergies were reviewed and updated in EMR.     Assessment/Plan:   Dayana Whalen is a 42 year old  female here for a telephone postpartum visit following a LTCS.    GDMA1  - recommend 2 hr GTT at 6 weeks or later postpartum,  ordered  - knows diet changes to work on. Discussed high risk for progression to prediabetes/diabetes.     Hypothyroidism  - taking synthroid 112mcg daily, same dose as prepregnancy  - Planning to establish care with PCP  - TSH ordered     Postpartum care  - doing well overall. Feels supported with current mental health resources.   - Contraception: planning vasectomy, knows to avoid unprotected intercourse for 3 months afterward  - Breastfeeding is going well   - next pap in 12/2023  - CBC ordered d/t acute blood loss anemia and current continued iron supplementation  - discussed reaching out if any other concerns in the next year, otherwise return to regular preventative care    Maria T Faust MD

## 2023-02-23 ENCOUNTER — NURSE TRIAGE (OUTPATIENT)
Dept: OBGYN | Facility: CLINIC | Age: 43
End: 2023-02-23
Payer: COMMERCIAL

## 2023-02-23 DIAGNOSIS — N61.0 MASTITIS: Primary | ICD-10-CM

## 2023-02-23 RX ORDER — DICLOXACILLIN SODIUM 500 MG
500 CAPSULE ORAL 4 TIMES DAILY
Qty: 40 CAPSULE | Refills: 0 | Status: SHIPPED | OUTPATIENT
Start: 2023-02-23 | End: 2023-03-05

## 2023-02-23 NOTE — TELEPHONE ENCOUNTER
"Patient called with concerns of mastitis in her right breast. Her right breast became tender a few days ago, then developed systemic symptoms last night including fever and chills. Fever was 100.9 and reduced with Tylenol. Followed clinic's mastitis treatment protocol for antibiotic Rx.     Reason for Disposition    Fever > 100.4 F  (38.0 C)     Used clinic's treatment protocol.    Additional Information    Negative: Sounds like a life-threatening emergency to the triager    Negative: Breastfeeding questions about baby    Negative: Breast pain or engorgement and mother plans to continue breastfeeding    Negative: Breast symptoms (redness, feeling sick) and mother plans to continue breastfeeding    Negative: Breastfeeding questions about mother's medicines and diet    Negative: SEVERE breast pain and fever > 103 F  (39.4 C)    Negative: Patient sounds very sick or weak to the triager    Answer Assessment - Initial Assessment Questions  1. PAIN: \"How bad is the pain?\"  (Scale 1-10; or mild, moderate, severe)    - MILD: doesn't interfere with normal activities     - MODERATE:- interferes with normal activities or awakens from sleep     - SEVERE: excruciating pain, unable to do any normal activities       3-4  2. SKIN: \"Does the skin appear red?\"  \"Does the breast feel hot to touch?\" \"Does the breast feel hard or have lumps?\"      Feels warm, top side with a tender area near top of breast  3. LOCATION: \"Which breast?\" (e.g., left, right, both) \"Is the pain in the breast or just the nipple?\"      Right  4. ONSET: \"When did the breast pain start?\" (e.g., hours, days)      Yesterday evening, but has been tender for a few days  5. DELIVERY: \"When was the baby born?\"       01/08, 2024  6. BREASTFEEDING: \"Have you been breastfeeding?\" If yes, ask: \"When did you last breastfeed?\"      Yes  7. FEVER: \"Do you have a fever?\" If Yes, ask: \"What is it, how was it measured, and when did it start?\"      Fever last evening at 100.9, " " with Tylenol  8. OTHER SYMPTOMS: \"Do you have any other symptoms?\" (e.g., breast redness or rash, feeling sad or depressed, nipple symptoms)      No redness, no rash, no nipple symptoms. Having chills and the chills are getting worse.    Protocols used: POSTPARTUM - BREAST PAIN AND [id number]      "

## 2023-05-21 ENCOUNTER — HEALTH MAINTENANCE LETTER (OUTPATIENT)
Age: 43
End: 2023-05-21

## 2023-08-12 ENCOUNTER — LAB (OUTPATIENT)
Dept: LAB | Facility: CLINIC | Age: 43
End: 2023-08-12
Payer: COMMERCIAL

## 2023-08-12 DIAGNOSIS — E03.9 ACQUIRED HYPOTHYROIDISM: ICD-10-CM

## 2023-08-12 DIAGNOSIS — D62 ANEMIA DUE TO BLOOD LOSS, ACUTE: ICD-10-CM

## 2023-08-12 LAB
ERYTHROCYTE [DISTWIDTH] IN BLOOD BY AUTOMATED COUNT: 13.9 % (ref 10–15)
HCT VFR BLD AUTO: 41.4 % (ref 35–47)
HGB BLD-MCNC: 12.8 G/DL (ref 11.7–15.7)
MCH RBC QN AUTO: 26.9 PG (ref 26.5–33)
MCHC RBC AUTO-ENTMCNC: 30.9 G/DL (ref 31.5–36.5)
MCV RBC AUTO: 87 FL (ref 78–100)
PLATELET # BLD AUTO: 347 10E3/UL (ref 150–450)
RBC # BLD AUTO: 4.75 10E6/UL (ref 3.8–5.2)
TSH SERPL DL<=0.005 MIU/L-ACNC: 1.61 UIU/ML (ref 0.3–4.2)
WBC # BLD AUTO: 8.9 10E3/UL (ref 4–11)

## 2023-08-12 PROCEDURE — 84443 ASSAY THYROID STIM HORMONE: CPT

## 2023-08-12 PROCEDURE — 85027 COMPLETE CBC AUTOMATED: CPT

## 2023-08-12 PROCEDURE — 36415 COLL VENOUS BLD VENIPUNCTURE: CPT

## 2024-03-10 ENCOUNTER — HEALTH MAINTENANCE LETTER (OUTPATIENT)
Age: 44
End: 2024-03-10

## 2024-07-28 ENCOUNTER — HEALTH MAINTENANCE LETTER (OUTPATIENT)
Age: 44
End: 2024-07-28

## 2025-08-10 ENCOUNTER — HEALTH MAINTENANCE LETTER (OUTPATIENT)
Age: 45
End: 2025-08-10

## (undated) DEVICE — PREP CHLORAPREP 26ML TINTED ORANGE  260815

## (undated) DEVICE — SU MONOCRYL 0 CT-1 36" Y346H

## (undated) DEVICE — GLOVE ESTEEM POWDER FREE SMT 6.5  2D72PT65

## (undated) DEVICE — SU VICRYL 4-0 PS-2 18" UND J496G

## (undated) DEVICE — PACK C-SECTION LF PL15OTA83B

## (undated) DEVICE — GLOVE PROTEXIS BLUE W/NEU-THERA 7.0  2D73EB70

## (undated) DEVICE — SOL NACL 0.9% IRRIG 1000ML BOTTLE 07138-09

## (undated) DEVICE — STRAP KNEE/BODY 31143004

## (undated) DEVICE — DRSG STERI STRIP 1/4X3" R1541

## (undated) DEVICE — DRSG ABDOMINAL 07 1/2X8" 7197D

## (undated) DEVICE — STOCKING SLEEVE COMPRESSION CALF LG

## (undated) DEVICE — Device

## (undated) DEVICE — CATH TRAY FOLEY SURESTEP 16FR WDRAIN BAG STLK LATEX A300316A

## (undated) DEVICE — SU VICRYL 0 CT-1 36" J346H

## (undated) DEVICE — SOL WATER IRRIG 1000ML BOTTLE 07139-09

## (undated) DEVICE — SU PDS II 0 CTX 60" Z990G

## (undated) RX ORDER — NALBUPHINE HYDROCHLORIDE 10 MG/ML
INJECTION, SOLUTION INTRAMUSCULAR; INTRAVENOUS; SUBCUTANEOUS
Status: DISPENSED
Start: 2023-01-09

## (undated) RX ORDER — MORPHINE SULFATE 2 MG/ML
INJECTION, SOLUTION INTRAMUSCULAR; INTRAVENOUS
Status: DISPENSED
Start: 2023-01-08

## (undated) RX ORDER — FENTANYL CITRATE-0.9 % NACL/PF 10 MCG/ML
PLASTIC BAG, INJECTION (ML) INTRAVENOUS
Status: DISPENSED
Start: 2023-01-08

## (undated) RX ORDER — FENTANYL CITRATE 50 UG/ML
INJECTION, SOLUTION INTRAMUSCULAR; INTRAVENOUS
Status: DISPENSED
Start: 2023-01-08

## (undated) RX ORDER — BUPIVACAINE HYDROCHLORIDE 2.5 MG/ML
INJECTION, SOLUTION EPIDURAL; INFILTRATION; INTRACAUDAL
Status: DISPENSED
Start: 2023-01-08

## (undated) RX ORDER — KETOROLAC TROMETHAMINE 30 MG/ML
INJECTION, SOLUTION INTRAMUSCULAR; INTRAVENOUS
Status: DISPENSED
Start: 2023-01-08